# Patient Record
Sex: FEMALE | Race: BLACK OR AFRICAN AMERICAN | NOT HISPANIC OR LATINO | URBAN - METROPOLITAN AREA
[De-identification: names, ages, dates, MRNs, and addresses within clinical notes are randomized per-mention and may not be internally consistent; named-entity substitution may affect disease eponyms.]

---

## 2022-08-16 ENCOUNTER — INPATIENT (INPATIENT)
Facility: HOSPITAL | Age: 77
LOS: 3 days | Discharge: HOME | DRG: 291 | End: 2022-08-20
Attending: STUDENT IN AN ORGANIZED HEALTH CARE EDUCATION/TRAINING PROGRAM | Admitting: INTERNAL MEDICINE
Payer: MEDICARE

## 2022-08-16 VITALS
TEMPERATURE: 98 F | HEART RATE: 82 BPM | WEIGHT: 235.01 LBS | HEIGHT: 63 IN | SYSTOLIC BLOOD PRESSURE: 122 MMHG | OXYGEN SATURATION: 96 % | RESPIRATION RATE: 16 BRPM | DIASTOLIC BLOOD PRESSURE: 71 MMHG

## 2022-08-16 DIAGNOSIS — Z29.9 ENCOUNTER FOR PROPHYLACTIC MEASURES, UNSPECIFIED: ICD-10-CM

## 2022-08-16 DIAGNOSIS — Z96.659 PRESENCE OF UNSPECIFIED ARTIFICIAL KNEE JOINT: Chronic | ICD-10-CM

## 2022-08-16 DIAGNOSIS — I48.91 UNSPECIFIED ATRIAL FIBRILLATION: ICD-10-CM

## 2022-08-16 DIAGNOSIS — E87.6 HYPOKALEMIA: ICD-10-CM

## 2022-08-16 DIAGNOSIS — E11.9 TYPE 2 DIABETES MELLITUS WITHOUT COMPLICATIONS: ICD-10-CM

## 2022-08-16 DIAGNOSIS — I50.9 HEART FAILURE, UNSPECIFIED: ICD-10-CM

## 2022-08-16 LAB
ALBUMIN SERPL ELPH-MCNC: 3 G/DL — LOW (ref 3.3–5)
ALP SERPL-CCNC: 90 U/L — SIGNIFICANT CHANGE UP (ref 40–120)
ALT FLD-CCNC: 26 U/L — SIGNIFICANT CHANGE UP (ref 12–78)
ANION GAP SERPL CALC-SCNC: 4 MMOL/L — LOW (ref 5–17)
AST SERPL-CCNC: 17 U/L — SIGNIFICANT CHANGE UP (ref 15–37)
BASOPHILS # BLD AUTO: 0.04 K/UL — SIGNIFICANT CHANGE UP (ref 0–0.2)
BASOPHILS NFR BLD AUTO: 0.6 % — SIGNIFICANT CHANGE UP (ref 0–2)
BILIRUB SERPL-MCNC: 0.4 MG/DL — SIGNIFICANT CHANGE UP (ref 0.2–1.2)
BUN SERPL-MCNC: 8 MG/DL — SIGNIFICANT CHANGE UP (ref 7–23)
CALCIUM SERPL-MCNC: 10 MG/DL — SIGNIFICANT CHANGE UP (ref 8.5–10.1)
CHLORIDE SERPL-SCNC: 96 MMOL/L — SIGNIFICANT CHANGE UP (ref 96–108)
CK SERPL-CCNC: 179 U/L — SIGNIFICANT CHANGE UP (ref 26–192)
CO2 SERPL-SCNC: 36 MMOL/L — HIGH (ref 22–31)
CREAT SERPL-MCNC: 0.6 MG/DL — SIGNIFICANT CHANGE UP (ref 0.5–1.3)
EGFR: 93 ML/MIN/1.73M2 — SIGNIFICANT CHANGE UP
EOSINOPHIL # BLD AUTO: 0.07 K/UL — SIGNIFICANT CHANGE UP (ref 0–0.5)
EOSINOPHIL NFR BLD AUTO: 1 % — SIGNIFICANT CHANGE UP (ref 0–6)
GLUCOSE SERPL-MCNC: 110 MG/DL — HIGH (ref 70–99)
HCT VFR BLD CALC: 37.3 % — SIGNIFICANT CHANGE UP (ref 34.5–45)
HGB BLD-MCNC: 11.7 G/DL — SIGNIFICANT CHANGE UP (ref 11.5–15.5)
IMM GRANULOCYTES NFR BLD AUTO: 0.3 % — SIGNIFICANT CHANGE UP (ref 0–1.5)
LYMPHOCYTES # BLD AUTO: 1.55 K/UL — SIGNIFICANT CHANGE UP (ref 1–3.3)
LYMPHOCYTES # BLD AUTO: 23.1 % — SIGNIFICANT CHANGE UP (ref 13–44)
MCHC RBC-ENTMCNC: 24.7 PG — LOW (ref 27–34)
MCHC RBC-ENTMCNC: 31.4 GM/DL — LOW (ref 32–36)
MCV RBC AUTO: 78.7 FL — LOW (ref 80–100)
MONOCYTES # BLD AUTO: 0.96 K/UL — HIGH (ref 0–0.9)
MONOCYTES NFR BLD AUTO: 14.3 % — HIGH (ref 2–14)
NEUTROPHILS # BLD AUTO: 4.07 K/UL — SIGNIFICANT CHANGE UP (ref 1.8–7.4)
NEUTROPHILS NFR BLD AUTO: 60.7 % — SIGNIFICANT CHANGE UP (ref 43–77)
NRBC # BLD: 0 /100 WBCS — SIGNIFICANT CHANGE UP (ref 0–0)
NT-PROBNP SERPL-SCNC: 660 PG/ML — HIGH (ref 0–450)
PLATELET # BLD AUTO: 235 K/UL — SIGNIFICANT CHANGE UP (ref 150–400)
POTASSIUM SERPL-MCNC: 3.2 MMOL/L — LOW (ref 3.5–5.3)
POTASSIUM SERPL-SCNC: 3.2 MMOL/L — LOW (ref 3.5–5.3)
PROT SERPL-MCNC: 7.2 G/DL — SIGNIFICANT CHANGE UP (ref 6–8.3)
RAPID RVP RESULT: SIGNIFICANT CHANGE UP
RBC # BLD: 4.74 M/UL — SIGNIFICANT CHANGE UP (ref 3.8–5.2)
RBC # FLD: 15.5 % — HIGH (ref 10.3–14.5)
SARS-COV-2 RNA SPEC QL NAA+PROBE: SIGNIFICANT CHANGE UP
SODIUM SERPL-SCNC: 136 MMOL/L — SIGNIFICANT CHANGE UP (ref 135–145)
TROPONIN I, HIGH SENSITIVITY RESULT: 13.7 NG/L — SIGNIFICANT CHANGE UP
WBC # BLD: 6.71 K/UL — SIGNIFICANT CHANGE UP (ref 3.8–10.5)
WBC # FLD AUTO: 6.71 K/UL — SIGNIFICANT CHANGE UP (ref 3.8–10.5)

## 2022-08-16 PROCEDURE — 99285 EMERGENCY DEPT VISIT HI MDM: CPT | Mod: FS

## 2022-08-16 PROCEDURE — 99223 1ST HOSP IP/OBS HIGH 75: CPT

## 2022-08-16 PROCEDURE — 93010 ELECTROCARDIOGRAM REPORT: CPT | Mod: 76

## 2022-08-16 PROCEDURE — 71045 X-RAY EXAM CHEST 1 VIEW: CPT | Mod: 26

## 2022-08-16 RX ORDER — DEXTROSE 50 % IN WATER 50 %
25 SYRINGE (ML) INTRAVENOUS ONCE
Refills: 0 | Status: DISCONTINUED | OUTPATIENT
Start: 2022-08-16 | End: 2022-08-20

## 2022-08-16 RX ORDER — ENOXAPARIN SODIUM 100 MG/ML
100 INJECTION SUBCUTANEOUS EVERY 12 HOURS
Refills: 0 | Status: DISCONTINUED | OUTPATIENT
Start: 2022-08-17 | End: 2022-08-20

## 2022-08-16 RX ORDER — ENOXAPARIN SODIUM 100 MG/ML
100 INJECTION SUBCUTANEOUS ONCE
Refills: 0 | Status: COMPLETED | OUTPATIENT
Start: 2022-08-16 | End: 2022-08-16

## 2022-08-16 RX ORDER — SODIUM CHLORIDE 9 MG/ML
1000 INJECTION, SOLUTION INTRAVENOUS
Refills: 0 | Status: DISCONTINUED | OUTPATIENT
Start: 2022-08-16 | End: 2022-08-20

## 2022-08-16 RX ORDER — GLUCAGON INJECTION, SOLUTION 0.5 MG/.1ML
1 INJECTION, SOLUTION SUBCUTANEOUS ONCE
Refills: 0 | Status: DISCONTINUED | OUTPATIENT
Start: 2022-08-16 | End: 2022-08-20

## 2022-08-16 RX ORDER — LANOLIN ALCOHOL/MO/W.PET/CERES
3 CREAM (GRAM) TOPICAL AT BEDTIME
Refills: 0 | Status: DISCONTINUED | OUTPATIENT
Start: 2022-08-16 | End: 2022-08-20

## 2022-08-16 RX ORDER — FUROSEMIDE 40 MG
20 TABLET ORAL DAILY
Refills: 0 | Status: DISCONTINUED | OUTPATIENT
Start: 2022-08-17 | End: 2022-08-17

## 2022-08-16 RX ORDER — INSULIN LISPRO 100/ML
VIAL (ML) SUBCUTANEOUS AT BEDTIME
Refills: 0 | Status: DISCONTINUED | OUTPATIENT
Start: 2022-08-16 | End: 2022-08-20

## 2022-08-16 RX ORDER — POTASSIUM CHLORIDE 20 MEQ
40 PACKET (EA) ORAL ONCE
Refills: 0 | Status: COMPLETED | OUTPATIENT
Start: 2022-08-16 | End: 2022-08-16

## 2022-08-16 RX ORDER — DEXTROSE 50 % IN WATER 50 %
12.5 SYRINGE (ML) INTRAVENOUS ONCE
Refills: 0 | Status: DISCONTINUED | OUTPATIENT
Start: 2022-08-16 | End: 2022-08-20

## 2022-08-16 RX ORDER — METFORMIN HYDROCHLORIDE 850 MG/1
1 TABLET ORAL
Qty: 0 | Refills: 0 | DISCHARGE

## 2022-08-16 RX ORDER — ACETAMINOPHEN 500 MG
650 TABLET ORAL EVERY 6 HOURS
Refills: 0 | Status: DISCONTINUED | OUTPATIENT
Start: 2022-08-16 | End: 2022-08-20

## 2022-08-16 RX ORDER — DEXTROSE 50 % IN WATER 50 %
15 SYRINGE (ML) INTRAVENOUS ONCE
Refills: 0 | Status: DISCONTINUED | OUTPATIENT
Start: 2022-08-16 | End: 2022-08-20

## 2022-08-16 RX ORDER — INSULIN LISPRO 100/ML
VIAL (ML) SUBCUTANEOUS
Refills: 0 | Status: DISCONTINUED | OUTPATIENT
Start: 2022-08-16 | End: 2022-08-20

## 2022-08-16 RX ORDER — FUROSEMIDE 40 MG
20 TABLET ORAL ONCE
Refills: 0 | Status: COMPLETED | OUTPATIENT
Start: 2022-08-16 | End: 2022-08-16

## 2022-08-16 RX ADMIN — ENOXAPARIN SODIUM 100 MILLIGRAM(S): 100 INJECTION SUBCUTANEOUS at 20:12

## 2022-08-16 RX ADMIN — Medication 650 MILLIGRAM(S): at 23:38

## 2022-08-16 RX ADMIN — Medication 20 MILLIGRAM(S): at 20:11

## 2022-08-16 RX ADMIN — Medication 40 MILLIEQUIVALENT(S): at 20:11

## 2022-08-16 NOTE — H&P ADULT - PROBLEM SELECTOR PLAN 4
- Unknown A1c, glucose 110 on admission.   - Pt reports finger stick at home 100-130's in fasting   - Hold home metformin  - Start lispro low dose slid correct. scale, hypoglycemia protocol  - Accuckecks and adjust insulin as needed  - Diabetic diet.

## 2022-08-16 NOTE — ED ADULT NURSE NOTE - OBJECTIVE STATEMENT
Patient is a 75yo female complaining of worsening cough shortness of breath and bilateral leg swelling, Patient denies chest pain nausea vomiting diarrhea fever. patient states that she has a cough and adenovirus. Patient states that she can not lay flat. Patient has a diagnosis of  CHF Family report increase b/l LE swelling and cough

## 2022-08-16 NOTE — H&P ADULT - NSHPSOCIALHISTORY_GEN_ALL_CORE
Pt lives with daughter.  Independent for her activities, using cane for chronic knee pain.   Former smoker 2 cig/day and quit 18 years ago.  Occasional alcohol use.   Covid vaccine x2 and 1 booster 12/21

## 2022-08-16 NOTE — CHART NOTE - NSCHARTNOTEFT_GEN_A_CORE
RN called for right upper leg cramping.   Pt given tylenol and heating pad.   Pt has new onset afib. Currently on full dose AC. Per H&P, pt with new lower leg edema attributed to possible CHF.  BL LE dopplers ordered to r/o DVT.

## 2022-08-16 NOTE — ED PROVIDER NOTE - OBJECTIVE STATEMENT
pmd: Dr. Kaushik Brush 75 yo female with h/o htn and chf presents to the ED c/o cough, sob and worsening peripheral edema x several days. Patient recently around her grandson who tested positive for adenovirus. Patient with worsening SOB with laying flat and exertion. Denies fever, chills, chest pain, abd pain, N/V, LE weakness or paresthesias, syncope. Patient takes htcz, not on lasix.   pmd: Dr. Kaushik Brush

## 2022-08-16 NOTE — H&P ADULT - PROBLEM SELECTOR PLAN 1
- Pt with 1-2 weeks of orthopnea, and the last 3 days with DUNCAN, LE edema and cough 2/2 acute HF. Less likely respiratory source, denies upper respiratory symptoms, no fever.   - No h/o CHF, not on Lasix at home   - Mild volume overload on exam, s/p Lasix 20mg IVP in the ED   - pBNP 660.   - Negative RVP, normal WBC.   - Continue Lasix 20 mg IVP qd   - TTE ordered.  - Monitor I&Os, renal function.   - Cardiology consult.

## 2022-08-16 NOTE — H&P ADULT - ATTENDING COMMENTS
76 yr old obese F with known DM,HTN a/w CHF decompensation, EKG with new onset A.Fib -hemodynamically stable  Full dose AC , no obvious CI  diurese  tele monitoring  I/O  monitor clinically  DASH diet    Cardiology evaluation

## 2022-08-16 NOTE — ED PROVIDER NOTE - CARE PLAN
Principal Discharge DX:	New onset atrial fibrillation  Secondary Diagnosis:	Acute on chronic systolic congestive heart failure   1

## 2022-08-16 NOTE — H&P ADULT - NSHPREVIEWOFSYSTEMS_GEN_ALL_CORE
CONSTITUTIONAL: denies fever, chills, fatigue, weakness  HEENT: denies sore throat, nasal congestion.   SKIN: denies new lesions, rash  CARDIOVASCULAR: denies chest pain, chest pressure  + palpitations 2 nights ago.   RESPIRATORY: + shortness of breath/DUNCAN, wet cough with clear secretions.   GASTROINTESTINAL: denies nausea, vomiting, diarrhea, abdominal pain  GENITOURINARY: denies dysuria  NEUROLOGICAL: denies focal numbness, headache, focal weakness  MUSCULOSKELETAL: denies new joint pain. + chronic knee pain   HEMATOLOGIC: denies gross bleeding, bruising, melena, hematochezia.   LYMPHATICS: + LE extremity swelling  PSYCHIATRIC: denies anxiety, depression

## 2022-08-16 NOTE — ED ADULT NURSE REASSESSMENT NOTE - NS ED NURSE REASSESS COMMENT FT1
Pt received from PRISCILLA Wong. Pt is AOx4 son at bedside. Pt repositioned up in bed. VSS. Pt has c/o cough denies chest pain or SOB. plan of care updated. will reassess.

## 2022-08-16 NOTE — H&P ADULT - ASSESSMENT
Pt is 75 yo female with PMH of HTN, T2DM, obesity, presents to the ED for DUNCAN. Pt reports 1 to 2 weeks of orthopnea, associated over the last 2 days to DUNCAN, wet cough and LE edema.  Admitted for acute heart failure and found to have Afib with rate controlled.

## 2022-08-16 NOTE — ED ADULT TRIAGE NOTE - CHIEF COMPLAINT QUOTE
Patient arrives in w/c c/o cough x 4 days. Denies nausea vomiting diarrhea. diagnosed with mild CHF and had Adenovirus. Family report increase b/l LE swelling.

## 2022-08-16 NOTE — ED PROVIDER NOTE - NS ED ATTENDING STATEMENT MOD
This was a shared visit with the ZAIN[facility]. I reviewed and verified the documentation and independently performed the documented:

## 2022-08-16 NOTE — ED PROVIDER NOTE - CLINICAL SUMMARY MEDICAL DECISION MAKING FREE TEXT BOX
76-year-old female with history of hypertension, CHF presents with shortness of breath over past 3 days.  Positive cough, slight productive.  No fevers or chills.  Patient with lower extremity edema worse than usual as well.  No neck or back pain.  No acute chest pain.  No numbness/tingling/focal weakness.  No known COVID exposures.  Patient is fully vaccinated for COVID.  No weakness or dizziness.  No aggravating/alleviating factors.  No other acute complaints at this time.  Exam: MMM, nontoxic, well-appearing.  Normal respiratory effort.  Decreased breath sounds bilateral lungs, no acute wheezing/rales.  2+ bilateral lower extremity edema.  Normal distal strength/sensation equal bilaterally.  Abdomen soft, nontender nondistended.  No other acute findings on exam.  Acute shortness of breath, with some URI symptoms, associate with lower extremity edema.  Patient with history of CHF.  Check labs, x-ray, RVP/COVID, likely admission.

## 2022-08-16 NOTE — H&P ADULT - NSHPPHYSICALEXAM_GEN_ALL_CORE
T(C): 36.6 (08-16-22 @ 19:30), Max: 36.6 (08-16-22 @ 19:30)  HR: 91 (08-16-22 @ 19:30) (82 - 91)  BP: 121/77 (08-16-22 @ 19:30) (121/77 - 122/71)  RR: 16 (08-16-22 @ 19:30) (16 - 16)  SpO2: 93% (08-16-22 @ 19:30) (93% - 96%)    GENERAL: patient appears well, no acute distress, breathing well at RA.   EYES: sclera clear, no exudates  ENMT: oropharynx clear, moist oral mucous.  NECK: supple, soft, no JVD  LUNGS: mild diffuse rhonchi improving after coughing.   HEART: soft, S1,S2. Irregular rate and rhythm, no murmurs noted.   ABDOMEN: bowel sounds present. Soft, nontender, nondistended.   NEUROLOGIC: awake, alert, oriented x3, non focal.   EXTREMITIES: no clubbing or cyanosis, distal and mild pitting LE edema.   SKIN: warm, appears well perfused.  HEME/LYMPH: no ecchymosis or signs of bleeding.   PSYCH: appropriately interactive, normal mood and affect. T(C): 36.6 (08-16-22 @ 19:30), Max: 36.6 (08-16-22 @ 19:30)  HR: 91 (08-16-22 @ 19:30) (82 - 91)  BP: 121/77 (08-16-22 @ 19:30) (121/77 - 122/71)  RR: 16 (08-16-22 @ 19:30) (16 - 16)  SpO2: 93% (08-16-22 @ 19:30) (93% - 96%)    GENERAL: patient appears well, no acute distress, breathing well at RA.   EYES: sclera clear, no exudates  ENMT: oropharynx clear, moist oral mucous.  NECK: supple, soft, no JVD  LUNGS: mild diffuse rhonchi improving after coughing.   HEART: soft, S1,S2. Irregular rate and rhythm, no murmurs noted.   ABDOMEN: bowel sounds present. Soft, nontender, nondistended.   NEUROLOGIC: awake, alert, oriented x3, non focal.   EXTREMITIES: no clubbing or cyanosis, distal and mild symmetric pitting LE edema.   SKIN: warm, appears well perfused.  HEME/LYMPH: no ecchymosis or signs of bleeding.   PSYCH: appropriately interactive, normal mood and affect.

## 2022-08-16 NOTE — H&P ADULT - PROBLEM SELECTOR PLAN 2
- No known h/o AFib.  Admits palpitations 2 nights ago that self resolved   - EKG with Afib rate controlled 82 bpm.   - No contraindications were found for anticoagulation.   - s/p Lovenox 100mg SC x1  - Continue full dose AC Lovenox   - Not BB for now in the setting of rate controlled and new acute CHF.  - Telemetry monitor. - No known h/o AFib.  Admits palpitations 2 nights ago that self resolved   - EKG with Afib rate controlled 82 bpm.   - EMMANUEL-DS-VAs score 5  - No contraindications were found for anticoagulation.   - s/p Lovenox 100mg SC x1  - Continue full dose AC Lovenox   - Not BB for now in the setting of rate controlled and new acute CHF.  - Telemetry monitor.

## 2022-08-17 ENCOUNTER — TRANSCRIPTION ENCOUNTER (OUTPATIENT)
Age: 77
End: 2022-08-17

## 2022-08-17 LAB
A1C WITH ESTIMATED AVERAGE GLUCOSE RESULT: 6.6 % — HIGH (ref 4–5.6)
ANION GAP SERPL CALC-SCNC: 6 MMOL/L — SIGNIFICANT CHANGE UP (ref 5–17)
BUN SERPL-MCNC: 8 MG/DL — SIGNIFICANT CHANGE UP (ref 7–23)
CALCIUM SERPL-MCNC: 10 MG/DL — SIGNIFICANT CHANGE UP (ref 8.5–10.1)
CHLORIDE SERPL-SCNC: 96 MMOL/L — SIGNIFICANT CHANGE UP (ref 96–108)
CO2 SERPL-SCNC: 37 MMOL/L — HIGH (ref 22–31)
CREAT SERPL-MCNC: 0.74 MG/DL — SIGNIFICANT CHANGE UP (ref 0.5–1.3)
EGFR: 84 ML/MIN/1.73M2 — SIGNIFICANT CHANGE UP
ESTIMATED AVERAGE GLUCOSE: 143 MG/DL — HIGH (ref 68–114)
GLUCOSE SERPL-MCNC: 117 MG/DL — HIGH (ref 70–99)
HCT VFR BLD CALC: 36.9 % — SIGNIFICANT CHANGE UP (ref 34.5–45)
HCV AB S/CO SERPL IA: 0.12 S/CO — SIGNIFICANT CHANGE UP (ref 0–0.99)
HCV AB SERPL-IMP: SIGNIFICANT CHANGE UP
HGB BLD-MCNC: 11.7 G/DL — SIGNIFICANT CHANGE UP (ref 11.5–15.5)
MAGNESIUM SERPL-MCNC: 1.9 MG/DL — SIGNIFICANT CHANGE UP (ref 1.6–2.6)
MCHC RBC-ENTMCNC: 24.7 PG — LOW (ref 27–34)
MCHC RBC-ENTMCNC: 31.7 GM/DL — LOW (ref 32–36)
MCV RBC AUTO: 77.8 FL — LOW (ref 80–100)
NRBC # BLD: 0 /100 WBCS — SIGNIFICANT CHANGE UP (ref 0–0)
PHOSPHATE SERPL-MCNC: 3.7 MG/DL — SIGNIFICANT CHANGE UP (ref 2.5–4.5)
PLATELET # BLD AUTO: 258 K/UL — SIGNIFICANT CHANGE UP (ref 150–400)
POTASSIUM SERPL-MCNC: 3.5 MMOL/L — SIGNIFICANT CHANGE UP (ref 3.5–5.3)
POTASSIUM SERPL-SCNC: 3.5 MMOL/L — SIGNIFICANT CHANGE UP (ref 3.5–5.3)
RBC # BLD: 4.74 M/UL — SIGNIFICANT CHANGE UP (ref 3.8–5.2)
RBC # FLD: 15.3 % — HIGH (ref 10.3–14.5)
SODIUM SERPL-SCNC: 139 MMOL/L — SIGNIFICANT CHANGE UP (ref 135–145)
WBC # BLD: 5.72 K/UL — SIGNIFICANT CHANGE UP (ref 3.8–10.5)
WBC # FLD AUTO: 5.72 K/UL — SIGNIFICANT CHANGE UP (ref 3.8–10.5)

## 2022-08-17 PROCEDURE — 99233 SBSQ HOSP IP/OBS HIGH 50: CPT

## 2022-08-17 PROCEDURE — 93970 EXTREMITY STUDY: CPT | Mod: 26

## 2022-08-17 PROCEDURE — 99223 1ST HOSP IP/OBS HIGH 75: CPT

## 2022-08-17 RX ORDER — METOPROLOL TARTRATE 50 MG
25 TABLET ORAL EVERY 12 HOURS
Refills: 0 | Status: DISCONTINUED | OUTPATIENT
Start: 2022-08-17 | End: 2022-08-20

## 2022-08-17 RX ORDER — METOPROLOL TARTRATE 50 MG
25 TABLET ORAL DAILY
Refills: 0 | Status: DISCONTINUED | OUTPATIENT
Start: 2022-08-17 | End: 2022-08-17

## 2022-08-17 RX ORDER — FUROSEMIDE 40 MG
20 TABLET ORAL
Refills: 0 | Status: DISCONTINUED | OUTPATIENT
Start: 2022-08-17 | End: 2022-08-18

## 2022-08-17 RX ADMIN — Medication 20 MILLIGRAM(S): at 06:31

## 2022-08-17 RX ADMIN — Medication 25 MILLIGRAM(S): at 19:00

## 2022-08-17 RX ADMIN — Medication 20 MILLIGRAM(S): at 19:00

## 2022-08-17 RX ADMIN — Medication 200 MILLIGRAM(S): at 12:24

## 2022-08-17 RX ADMIN — Medication 200 MILLIGRAM(S): at 23:33

## 2022-08-17 RX ADMIN — ENOXAPARIN SODIUM 100 MILLIGRAM(S): 100 INJECTION SUBCUTANEOUS at 06:30

## 2022-08-17 RX ADMIN — Medication 25 MILLIGRAM(S): at 12:24

## 2022-08-17 RX ADMIN — Medication 200 MILLIGRAM(S): at 18:59

## 2022-08-17 RX ADMIN — ENOXAPARIN SODIUM 100 MILLIGRAM(S): 100 INJECTION SUBCUTANEOUS at 19:00

## 2022-08-17 RX ADMIN — Medication 650 MILLIGRAM(S): at 00:38

## 2022-08-17 NOTE — DISCHARGE NOTE NURSING/CASE MANAGEMENT/SOCIAL WORK - NSSCTYPOFSERV_GEN_ALL_CORE
Nursing and Physical Therapy with evaluation for home health aide and social work Nursing and Physical Therapy with evaluation for home health aide and social work.  Agency will call within 24-48 hours after discharge

## 2022-08-17 NOTE — CONSULT NOTE ADULT - SUBJECTIVE AND OBJECTIVE BOX
[facility]         Shawna Bell, Shola, Isaura, Kevin, Sg, Jackie        [phone] (office)    CHIEF COMPLAINT: Patient is a 76y old  Female who presents with a chief complaint of CHF exacerbation (17 Aug 2022 08:29)      HPI:  Pt is 77 yo female with PMH of HTN, T2DM, obesity, presents to the ED for DUNCAN. Pt reports 1 to 2 weeks of orthopnea, associated over the last 2 days to DUNCAN, wet cough and LE edema. Pt states normal breathing at rest/sitting position. Admits palpitations 2 nights ago that resolved, and non associated to other symptoms. At the moment of the evaluation pt reports improvement of LE edema after IV med.  Denies dizziness, lightheadedness, fever, chest pain, focal weakness or numbness, abdominal pain.   Denies previous h/o any arrhythmia, Afib, CHF.   As per pt, last PCP visit months ago, EKG and labs done "normal". She is not seeing cardiologist.    ED course:  - Vitals:  /71, HR 82, RR 16, temp 97.6, O2 Sat 96 % at RA  - Labs: WBC 6.71, Hb 11.7 (MCV 78), .  K 3.2, HCO3 36. pBNP 660.  Cr 0.6.  Troponin normal.  Negative RVP and Covid.    - CXR:  pending official reading.   - EKG: afib, normal rate 82 bpm    - s/p Lasix 20mg IVP x1, Lovenox 100mg SC qd, K 40 meq PO.  (16 Aug 2022 22:07)    Interval HPI:   Patient seen and examined at beside. Patient laying comfortably and in NAD. Son at bedside who is Respiratory Therapist states that the LE edema has improved tremendously Patient denies any heart palpitions or chest pain. Though dose endorses dyspnea on exertion. Of note, overnight was complaining of b/l leg cramps that have now resolved.     PAST MEDICAL & SURGICAL HISTORY:  Hypertension      Type 2 diabetes mellitus      History of knee replacement  bilateral          SOCIAL HISTORY: No active tobacco, alcohol or illicit drug use    FAMILY HISTORY:  No pertinent family history in first degree relatives     No pertinent family history of CAD    Outpatient medications:    MEDICATIONS  (STANDING):  dextrose 5%. 1000 milliLiter(s) (50 mL/Hr) IV Continuous <Continuous>  dextrose 5%. 1000 milliLiter(s) (100 mL/Hr) IV Continuous <Continuous>  dextrose 50% Injectable 25 Gram(s) IV Push once  dextrose 50% Injectable 12.5 Gram(s) IV Push once  dextrose 50% Injectable 25 Gram(s) IV Push once  enoxaparin Injectable 100 milliGRAM(s) SubCutaneous every 12 hours  furosemide   Injectable 20 milliGRAM(s) IV Push daily  glucagon  Injectable 1 milliGRAM(s) IntraMuscular once  insulin lispro (ADMELOG) corrective regimen sliding scale   SubCutaneous three times a day before meals  insulin lispro (ADMELOG) corrective regimen sliding scale   SubCutaneous at bedtime    MEDICATIONS  (PRN):  acetaminophen     Tablet .. 650 milliGRAM(s) Oral every 6 hours PRN Temp greater or equal to 38C (100.4F), Mild Pain (1 - 3)  dextrose Oral Gel 15 Gram(s) Oral once PRN Blood Glucose LESS THAN 70 milliGRAM(s)/deciliter  melatonin 3 milliGRAM(s) Oral at bedtime PRN Insomnia      Allergies    No Known Allergies    Intolerances        REVIEW OF SYSTEMS: Is negative for eye, ENT, GI, , allergic, dermatologic, musculoskeletal and neurologic, except as described above.    VITAL SIGNS:   Vital Signs Last 24 Hrs  T(C): 36.7 (17 Aug 2022 07:58), Max: 36.7 (17 Aug 2022 07:58)  T(F): 98 (17 Aug 2022 07:58), Max: 98 (17 Aug 2022 07:58)  HR: 88 (17 Aug 2022 07:58) (82 - 91)  BP: 144/70 (17 Aug 2022 07:58) (121/77 - 167/77)  BP(mean): --  RR: 21 (17 Aug 2022 07:58) (16 - 22)  SpO2: 98% (17 Aug 2022 07:58) (93% - 98%)    Parameters below as of 17 Aug 2022 06:37  Patient On (Oxygen Delivery Method): room air        I&O's Summary    16 Aug 2022 07:01  -  17 Aug 2022 07:00  --------------------------------------------------------  IN: 0 mL / OUT: 1500 mL / NET: -1500 mL        PHYSICAL EXAM:    Constitutional: NAD, awake and alert, well-developed  Pulmonary: Non-labored, breath sounds are clear bilaterally though decreased in lower lobes. No wheezing, rales or rhonchi  Cardiovascular: Irregular heart rhythm.  No rubs, gallops or clicks  Gastrointestinalsoft, nontender.   Lymph: trace peripheral edema.   Neurological: Alert, strength and sensitivity are grossly intact  Skin: No obvious lesions/rashes.   Psych:  Mood & affect appropriate .    LABS: All Labs Reviewed:                        11.7   6.71  )-----------( 235      ( 16 Aug 2022 16:08 )             37.3     17 Aug 2022 06:50    139    |  96     |  8      ----------------------------<  117    3.5     |  37     |  0.74   16 Aug 2022 16:08    136    |  96     |  8      ----------------------------<  110    3.2     |  36     |  0.60     Ca    10.0       17 Aug 2022 06:50  Ca    10.0       16 Aug 2022 16:08  Phos  3.7       17 Aug 2022 06:50  Mg     1.9       17 Aug 2022 06:50    TPro  7.2    /  Alb  3.0    /  TBili  0.4    /  DBili  x      /  AST  17     /  ALT  26     /  AlkPhos  90     16 Aug 2022 16:08      CARDIAC MARKERS ( 16 Aug 2022 16:08 )  x     / x     / 179 U/L / x     / x          Blood Culture:   08-16 @ 16:08  Pro Bnp 660        RADIOLOGY: Chest X-ray done pending official read     EKG: AFib HR 88 BPM     Impression/Plan:

## 2022-08-17 NOTE — PROGRESS NOTE ADULT - PROBLEM SELECTOR PLAN 2
- No known h/o AFib.  Admits palpitations 2 nights ago that self resolved   - EKG with Afib rate controlled 82 bpm.   - EMMANUEL-DS-VAs score 5  - No contraindications were found for anticoagulation.   - s/p Lovenox 100mg SC x1  - Continue full dose AC Lovenox   - Not BB for now in the setting of rate controlled and new acute CHF.  - Telemetry monitor. - No known h/o AFib.  Admits palpitations 2 nights ago that self resolved   - EKG with Afib rate controlled 82 bpm.   - EMMANUEL-DS-VAs score 5  - No contraindications were found for anticoagulation.   - s/p Lovenox 100mg SC x1  - Continue full dose AC Lovenox   - Not BB for now in the setting of rate controlled and new acute CHF.  - Telemetry monitor  -  Cardiology recs

## 2022-08-17 NOTE — PROGRESS NOTE ADULT - SUBJECTIVE AND OBJECTIVE BOX
SUBJECTIVE:    No acute events overnight, afebrile, hds.    VITAL SIGNS:    Vital Signs Last 24 Hrs  T(C): 36.7 (17 Aug 2022 07:58), Max: 36.7 (17 Aug 2022 07:58)  T(F): 98 (17 Aug 2022 07:58), Max: 98 (17 Aug 2022 07:58)  HR: 88 (17 Aug 2022 07:58) (82 - 91)  BP: 144/70 (17 Aug 2022 07:58) (121/77 - 167/77)  BP(mean): --  RR: 21 (17 Aug 2022 07:58) (16 - 22)  SpO2: 98% (17 Aug 2022 07:58) (93% - 98%)    Parameters below as of 17 Aug 2022 06:37  Patient On (Oxygen Delivery Method): room air        PHYSICAL EXAM:     GENERAL: no acute distress  HEENT: NC/AT, EOMI, neck supple, MMM  RESPIRATORY: LCTAB/L, no rhonchi, rales, or wheezing  CARDIOVASCULAR: RRR, no murmurs, gallops, rubs  ABDOMINAL: soft, non-tender, non-distended, positive bowel sounds   EXTREMITIES: no clubbing, cyanosis, or edema  NEUROLOGICAL: alert and oriented x 3, non-focal  SKIN: no rashes or lesions   MUSCULOSKELETAL: no gross joint deformity                          11.7   6.71  )-----------( 235      ( 16 Aug 2022 16:08 )             37.3     08-17    139  |  96  |  8   ----------------------------<  117<H>  3.5   |  37<H>  |  0.74    Ca    10.0      17 Aug 2022 06:50  Phos  3.7     08-17  Mg     1.9     08-17    TPro  7.2  /  Alb  3.0<L>  /  TBili  0.4  /  DBili  x   /  AST  17  /  ALT  26  /  AlkPhos  90  08-16      CAPILLARY BLOOD GLUCOSE      POCT Blood Glucose.: 138 mg/dL (16 Aug 2022 23:36)      MEDICATIONS  (STANDING):  dextrose 5%. 1000 milliLiter(s) (50 mL/Hr) IV Continuous <Continuous>  dextrose 5%. 1000 milliLiter(s) (100 mL/Hr) IV Continuous <Continuous>  dextrose 50% Injectable 25 Gram(s) IV Push once  dextrose 50% Injectable 12.5 Gram(s) IV Push once  dextrose 50% Injectable 25 Gram(s) IV Push once  enoxaparin Injectable 100 milliGRAM(s) SubCutaneous every 12 hours  furosemide   Injectable 20 milliGRAM(s) IV Push daily  glucagon  Injectable 1 milliGRAM(s) IntraMuscular once  insulin lispro (ADMELOG) corrective regimen sliding scale   SubCutaneous three times a day before meals  insulin lispro (ADMELOG) corrective regimen sliding scale   SubCutaneous at bedtime       SUBJECTIVE:    Afebrile, hds.  Pt had a sense of overnight leg cramping.    VITAL SIGNS:    Vital Signs Last 24 Hrs  T(C): 36.7 (17 Aug 2022 07:58), Max: 36.7 (17 Aug 2022 07:58)  T(F): 98 (17 Aug 2022 07:58), Max: 98 (17 Aug 2022 07:58)  HR: 88 (17 Aug 2022 07:58) (82 - 91)  BP: 144/70 (17 Aug 2022 07:58) (121/77 - 167/77)  BP(mean): --  RR: 21 (17 Aug 2022 07:58) (16 - 22)  SpO2: 98% (17 Aug 2022 07:58) (93% - 98%)    Parameters below as of 17 Aug 2022 06:37  Patient On (Oxygen Delivery Method): room air        PHYSICAL EXAM:     GENERAL: no acute distress  HEENT: NC/AT, EOMI, neck supple, MMM  RESPIRATORY: LCTAB/L, no rhonchi, rales, or wheezing  CARDIOVASCULAR: RRR, no murmurs, gallops, rubs  ABDOMINAL: soft, non-tender, non-distended, positive bowel sounds   EXTREMITIES: no clubbing, cyanosis, b/l le edema  NEUROLOGICAL: alert and oriented x 3, non-focal  SKIN: no rashes or lesions   MUSCULOSKELETAL: no gross joint deformity                          11.7   6.71  )-----------( 235      ( 16 Aug 2022 16:08 )             37.3     08-17    139  |  96  |  8   ----------------------------<  117<H>  3.5   |  37<H>  |  0.74    Ca    10.0      17 Aug 2022 06:50  Phos  3.7     08-17  Mg     1.9     08-17    TPro  7.2  /  Alb  3.0<L>  /  TBili  0.4  /  DBili  x   /  AST  17  /  ALT  26  /  AlkPhos  90  08-16      CAPILLARY BLOOD GLUCOSE      POCT Blood Glucose.: 138 mg/dL (16 Aug 2022 23:36)      MEDICATIONS  (STANDING):  dextrose 5%. 1000 milliLiter(s) (50 mL/Hr) IV Continuous <Continuous>  dextrose 5%. 1000 milliLiter(s) (100 mL/Hr) IV Continuous <Continuous>  dextrose 50% Injectable 25 Gram(s) IV Push once  dextrose 50% Injectable 12.5 Gram(s) IV Push once  dextrose 50% Injectable 25 Gram(s) IV Push once  enoxaparin Injectable 100 milliGRAM(s) SubCutaneous every 12 hours  furosemide   Injectable 20 milliGRAM(s) IV Push daily  glucagon  Injectable 1 milliGRAM(s) IntraMuscular once  insulin lispro (ADMELOG) corrective regimen sliding scale   SubCutaneous three times a day before meals  insulin lispro (ADMELOG) corrective regimen sliding scale   SubCutaneous at bedtime

## 2022-08-17 NOTE — DISCHARGE NOTE NURSING/CASE MANAGEMENT/SOCIAL WORK - PATIENT PORTAL LINK FT
You can access the FollowMyHealth Patient Portal offered by [facility] by registering at the following website: [email or web address]. By joining [employer]’s FollowMyHealth portal, you will also be able to view your health information using other applications (apps) compatible with our system.

## 2022-08-17 NOTE — CONSULT NOTE ADULT - ASSESSMENT
75 yo female with PMH of HTN, T2DM, obesity, presents to the ED for DUNCAN. Pt reports 1 to 2 weeks of orthopnea, associated over the last 2 days to DUNCAN, wet cough and LE edema.  Admitted for acute heart failure and found to have Afib with rate controlled.     CHARTING IN PROGRESS  77 yo female with PMH of HTN, T2DM, obesity, presents to the ED for DUNCAN. Pt reports 1 to 2 weeks of orthopnea, associated over the last 2 days to DUNCAN, wet cough and LE edema.  Admitted for acute heart failure and found to have Afib with rate controlled.     - Patient with B/L leg edema + dyspnea on exertion; TTE ordered F/U official read   - Will increase to Lasix 20mg IV BID   - EKG with new onset Afib HR of 88 BPM and on tele monitor a-fib with rates of 90; Start Metoprolol 25mg OD   - Continue on Lovenox Subq and to be transition to Eliquis upon discharge  - Hx of HTN; Hold home Hydrochlorothiazide    - Monitor and replete lyes Mg>2 and K>4   - Will continue to follow      77 yo female with PMH of HTN, T2DM, obesity, presents to the ED for DUNCAN. Pt reports 1 to 2 weeks of orthopnea, associated over the last 2 days to DUNCAN, wet cough and LE edema.  Admitted for acute heart failure and found to have Afib with rate controlled.     - ADHF  - Patient with B/L leg edema + dyspnea on exertion; TTE ordered F/U official read   - Will increase to Lasix 20mg IV BID   - strict I/o's and daily weights  - EKG with new onset Afib HR of 88 BPM and on tele monitor a-fib with rates of 90; Start Metoprolol tartrate 25 bid  - Continue on Lovenox Subq and to be transition to Eliquis upon discharge  - Hx of HTN; Hold home Hydrochlorothiazide    - Monitor and replete lyes Mg>2 and K>4   - Will continue to follow

## 2022-08-17 NOTE — SBIRT NOTE ADULT - NSSBIRTALCPOSREINDET_GEN_A_CORE
Occasionally drinks wine, can go a month without drinking. No issues. Positive reinforcement provided.

## 2022-08-17 NOTE — DISCHARGE NOTE NURSING/CASE MANAGEMENT/SOCIAL WORK - NSDCPEFALRISK_GEN_ALL_CORE
For information on Fall & Injury Prevention, visit: [email or web address] OR  [email or web address] OR  https://www.cdc.gov/steadi/patient.html

## 2022-08-17 NOTE — CONSULT NOTE ADULT - ATTENDING COMMENTS
mild volume overload and adhf, in the setting of new af  lasix 20 iv bid  echocardiogram to evaluate LV function  af with fair rates, start metoprolol 25 bid  cont ac with lovenox with plan to switch to oral ac

## 2022-08-17 NOTE — PATIENT PROFILE ADULT - FALL HARM RISK - RISK INTERVENTIONS

## 2022-08-17 NOTE — PROGRESS NOTE ADULT - PROBLEM SELECTOR PLAN 1
- Pt with 1-2 weeks of orthopnea, and the last 3 days with DUNCAN, LE edema and cough 2/2 acute HF. Less likely respiratory source, denies upper respiratory symptoms, no fever.   - No h/o CHF, not on Lasix at home   - Mild volume overload on exam, s/p Lasix 20mg IVP in the ED   - pBNP 660.   - Negative RVP, normal WBC.   - Continue Lasix 20 mg IVP qd   - TTE ordered.  - Monitor I&Os, renal function.   - Cardiology consult. - Pt with 1-2 weeks of orthopnea, and the last 3 days with DUNCAN, LE edema and cough 2/2 acute HF. Less likely respiratory source, denies upper respiratory symptoms, no fever.   - No h/o CHF, not on Lasix at home   - Mild volume overload on exam, s/p Lasix 20mg IVP in the ED   - pBNP 660.   - Negative RVP, normal WBC.   - Continue Lasix 20 mg IVP qd   - TTE ordered; LE dopplers ordered fro ovenright leg pain, will f/u results  - Monitor I&Os, renal function.   - Cardiology consult.

## 2022-08-17 NOTE — PHYSICAL THERAPY INITIAL EVALUATION ADULT - ADDITIONAL COMMENTS
Patient reports that she lives with her daughter in a private house, no MARINA, bed/bath on main level. Ambulates with quad cane at baseline, daughter assists as needed with ADLs.

## 2022-08-18 ENCOUNTER — TRANSCRIPTION ENCOUNTER (OUTPATIENT)
Age: 77
End: 2022-08-18

## 2022-08-18 LAB
ANION GAP SERPL CALC-SCNC: 6 MMOL/L — SIGNIFICANT CHANGE UP (ref 5–17)
BUN SERPL-MCNC: 13 MG/DL — SIGNIFICANT CHANGE UP (ref 7–23)
CALCIUM SERPL-MCNC: 9.6 MG/DL — SIGNIFICANT CHANGE UP (ref 8.5–10.1)
CHLORIDE SERPL-SCNC: 96 MMOL/L — SIGNIFICANT CHANGE UP (ref 96–108)
CO2 SERPL-SCNC: 34 MMOL/L — HIGH (ref 22–31)
CREAT SERPL-MCNC: 0.75 MG/DL — SIGNIFICANT CHANGE UP (ref 0.5–1.3)
EGFR: 82 ML/MIN/1.73M2 — SIGNIFICANT CHANGE UP
GLUCOSE SERPL-MCNC: 102 MG/DL — HIGH (ref 70–99)
HCT VFR BLD CALC: 40.6 % — SIGNIFICANT CHANGE UP (ref 34.5–45)
HGB BLD-MCNC: 12.8 G/DL — SIGNIFICANT CHANGE UP (ref 11.5–15.5)
MCHC RBC-ENTMCNC: 24.8 PG — LOW (ref 27–34)
MCHC RBC-ENTMCNC: 31.5 GM/DL — LOW (ref 32–36)
MCV RBC AUTO: 78.7 FL — LOW (ref 80–100)
NRBC # BLD: 0 /100 WBCS — SIGNIFICANT CHANGE UP (ref 0–0)
PLATELET # BLD AUTO: 260 K/UL — SIGNIFICANT CHANGE UP (ref 150–400)
POTASSIUM SERPL-MCNC: 3.8 MMOL/L — SIGNIFICANT CHANGE UP (ref 3.5–5.3)
POTASSIUM SERPL-SCNC: 3.8 MMOL/L — SIGNIFICANT CHANGE UP (ref 3.5–5.3)
RBC # BLD: 5.16 M/UL — SIGNIFICANT CHANGE UP (ref 3.8–5.2)
RBC # FLD: 15.3 % — HIGH (ref 10.3–14.5)
SODIUM SERPL-SCNC: 136 MMOL/L — SIGNIFICANT CHANGE UP (ref 135–145)
WBC # BLD: 4.79 K/UL — SIGNIFICANT CHANGE UP (ref 3.8–10.5)
WBC # FLD AUTO: 4.79 K/UL — SIGNIFICANT CHANGE UP (ref 3.8–10.5)

## 2022-08-18 PROCEDURE — 93306 TTE W/DOPPLER COMPLETE: CPT | Mod: 26

## 2022-08-18 PROCEDURE — 99232 SBSQ HOSP IP/OBS MODERATE 35: CPT

## 2022-08-18 PROCEDURE — 99233 SBSQ HOSP IP/OBS HIGH 50: CPT

## 2022-08-18 RX ORDER — METOPROLOL TARTRATE 50 MG
1 TABLET ORAL
Qty: 60 | Refills: 0
Start: 2022-08-18 | End: 2022-09-16

## 2022-08-18 RX ORDER — FUROSEMIDE 40 MG
1 TABLET ORAL
Qty: 60 | Refills: 0
Start: 2022-08-18 | End: 2022-09-16

## 2022-08-18 RX ORDER — APIXABAN 2.5 MG/1
1 TABLET, FILM COATED ORAL
Qty: 60 | Refills: 0
Start: 2022-08-18 | End: 2022-09-16

## 2022-08-18 RX ORDER — FUROSEMIDE 40 MG
40 TABLET ORAL DAILY
Refills: 0 | Status: DISCONTINUED | OUTPATIENT
Start: 2022-08-18 | End: 2022-08-19

## 2022-08-18 RX ADMIN — Medication 40 MILLIGRAM(S): at 13:35

## 2022-08-18 RX ADMIN — Medication 25 MILLIGRAM(S): at 05:11

## 2022-08-18 RX ADMIN — ENOXAPARIN SODIUM 100 MILLIGRAM(S): 100 INJECTION SUBCUTANEOUS at 05:11

## 2022-08-18 RX ADMIN — Medication 200 MILLIGRAM(S): at 13:35

## 2022-08-18 RX ADMIN — Medication 25 MILLIGRAM(S): at 17:48

## 2022-08-18 RX ADMIN — Medication 20 MILLIGRAM(S): at 05:11

## 2022-08-18 RX ADMIN — Medication 200 MILLIGRAM(S): at 05:11

## 2022-08-18 RX ADMIN — ENOXAPARIN SODIUM 100 MILLIGRAM(S): 100 INJECTION SUBCUTANEOUS at 17:48

## 2022-08-18 RX ADMIN — Medication 200 MILLIGRAM(S): at 17:48

## 2022-08-18 NOTE — DISCHARGE NOTE PROVIDER - NSDCMRMEDTOKEN_GEN_ALL_CORE_FT
hydroCHLOROthiazide 25 mg oral tablet: 1 tab(s) orally once a day  metFORMIN 500 mg oral tablet, extended release: 1 tab(s) orally once a day (in the morning)   Eliquis 5 mg oral tablet: 1 tab(s) orally 2 times a day   hydroCHLOROthiazide 25 mg oral tablet: 1 tab(s) orally once a day  Lasix 20 mg oral tablet: 1 tab(s) orally 2 times a day   metFORMIN 500 mg oral tablet, extended release: 1 tab(s) orally once a day (in the morning)  metoprolol tartrate 25 mg oral tablet: 1 tab(s) orally every 12 hours   Eliquis 5 mg oral tablet: 1 tab(s) orally 2 times a day   Klor-Con M20 oral tablet, extended release: 1 tab(s) orally once a day   Lasix 20 mg oral tablet: 2 tab(s) orally 2 times a day   metFORMIN 500 mg oral tablet, extended release: 1 tab(s) orally once a day (in the morning)  metoprolol tartrate 25 mg oral tablet: 1 tab(s) orally every 12 hours

## 2022-08-18 NOTE — DISCHARGE NOTE PROVIDER - HOSPITAL COURSE
FROM ADMISSION H+P:   HPI:  Pt is 77 yo female with PMH of HTN, T2DM, obesity, presents to the ED for DUNCAN. Pt reports 1 to 2 weeks of orthopnea, associated over the last 2 days to DUNCAN, wet cough and LE edema. Pt states normal breathing at rest/sitting position. Admits palpitations 2 nights ago that resolved, and non associated to other symptoms. At the moment of the evaluation pt reports improvement of LE edema after IV med.  Denies dizziness, lightheadedness, fever, chest pain, focal weakness or numbness, abdominal pain.   Denies previous h/o any arrhythmia, Afib, CHF.   As per pt, last PCP visit months ago, EKG and labs done "normal". She is not seeing cardiologist.    ED course:  - Vitals:  /71, HR 82, RR 16, temp 97.6, O2 Sat 96 % at RA  - Labs: WBC 6.71, Hb 11.7 (MCV 78), .  K 3.2, HCO3 36. pBNP 660.  Cr 0.6.  Troponin normal.  Negative RVP and Covid.    - CXR:  pending official reading.   - EKG: afib, normal rate 82 bpm    - s/p Lasix 20mg IVP x1, Lovenox 100mg SC qd, K 40 meq PO.  (16 Aug 2022 22:07)      ---  HOSPITAL COURSE:   Patient was admitted for acute CHF and afib rate controlled. Patient was seen by cardiology and was started on IV lasix 20mg BID and Lopressor 25mg BID. Patient's home medication HCTZ was held inpatient in setting of YOBANI. TTE was ordered which showed _______.   Patient was seen by PT and was recommended home PT.   Patient was medically optimized and clinically improved prior to discharge.     ---  CONSULTANTS:   [facility]     ---  TIME SPENT:  I, the attending physician, was physically present for the key portions of the evaluation and management (E/M) service provided. The total amount of time spent reviewing the hospital notes, laboratory values, imaging findings, assessing/counseling the patient, discussing with consultant physicians, social work, nursing staff was -- minutes     FROM ADMISSION H+P:   HPI:  Pt is 75 yo female with PMH of HTN, T2DM, obesity, presents to the ED for DUNCAN. Pt reports 1 to 2 weeks of orthopnea, associated over the last 2 days to DUNCAN, wet cough and LE edema. Pt states normal breathing at rest/sitting position. Admits palpitations 2 nights ago that resolved, and non associated to other symptoms. At the moment of the evaluation pt reports improvement of LE edema after IV med.  Denies dizziness, lightheadedness, fever, chest pain, focal weakness or numbness, abdominal pain.   Denies previous h/o any arrhythmia, Afib, CHF.   As per pt, last PCP visit months ago, EKG and labs done "normal". She is not seeing cardiologist.    ED course:  - Vitals:  /71, HR 82, RR 16, temp 97.6, O2 Sat 96 % at RA  - Labs: WBC 6.71, Hb 11.7 (MCV 78), .  K 3.2, HCO3 36. pBNP 660.  Cr 0.6.  Troponin normal.  Negative RVP and Covid.    - CXR:  pending official reading.   - EKG: afib, normal rate 82 bpm    - s/p Lasix 20mg IVP x1, Lovenox 100mg SC qd, K 40 meq PO.  (16 Aug 2022 22:07)      ---  HOSPITAL COURSE:   Patient was admitted for acute CHF and afib rate controlled. Patient was seen by cardiology and was started on IV lasix 20mg BID and Lopressor 25mg BID. Lopressor was switched to Toprol 50mg daily on day of discharge. Patient was started on Lovenox 100mg for Afib and switched to Eliquis 5mg BID prior to dc. Patient's home medication HCTZ was held inpatient in setting of YOBANI. TTE was ordered and evaluated by Cardiology.   Patient was seen by PT and was recommended home PT.   Patient was medically optimized and clinically improved prior to discharge.     ---  CONSULTANTS:   [facility]     ---  TIME SPENT:  I, the attending physician, was physically present for the key portions of the evaluation and management (E/M) service provided. The total amount of time spent reviewing the hospital notes, laboratory values, imaging findings, assessing/counseling the patient, discussing with consultant physicians, social work, nursing staff was -- minutes     FROM ADMISSION H+P:   HPI:  Pt is 75 yo female with PMH of HTN, T2DM, obesity, presents to the ED for DUNCAN. Pt reports 1 to 2 weeks of orthopnea, associated over the last 2 days to DUNCAN, wet cough and LE edema. Pt states normal breathing at rest/sitting position. Admits palpitations 2 nights ago that resolved, and non associated to other symptoms. At the moment of the evaluation pt reports improvement of LE edema after IV med.       ---  HOSPITAL COURSE:   Patient was admitted for acute CHF and afib rate controlled. Patient was seen by cardiology and was started on IV lasix 20mg BID and Lopressor 25mg BID. Lopressor was switched to Toprol 50mg daily on day of discharge. Patient was started on Lovenox 100mg for Afib and switched to Eliquis 5mg BID prior to dc. Patient's home medication HCTZ was held inpatient in setting of YOBANI. TTE was ordered and evaluated by Cardiology. Pt will go home on 40mg po lasix bid until following with cardiology.   Patient was seen by PT and was recommended home PT.   Patient was medically optimized and clinically improved prior to discharge.     ---  CONSULTANTS:   [facility]

## 2022-08-18 NOTE — DISCHARGE NOTE PROVIDER - ATTENDING DISCHARGE PHYSICAL EXAMINATION:
Objective:    Vitals:  T(C): 36.8 (08-18-22 @ 11:37), Max: 36.8 (08-18-22 @ 04:20)  HR: 87 (08-18-22 @ 11:37) (73 - 91)  BP: 117/79 (08-18-22 @ 11:37) (107/71 - 123/80)  RR: 18 (08-18-22 @ 11:37) (18 - 18)  SpO2: 91% (08-18-22 @ 11:37) (91% - 93%)    Physical Exam:  General: comfortable, no acute distress, well nourished  HEENT: Atraumatic, no LAD, trachea midline, PERRLA  Cardiovascular: normal s1s2, no murmurs, gallops or fricition rubs  Pulmonary: clear to ausculation Bilaterally, no wheezing , rhonchi  Gastrointestinal: soft non tender non distended, no masses felt, no organomegally  Muscloskeletal: no lower extremity edema, intact bilateral lower extremity pulses  Neurological: CN II-12 intact. No focal weakness  Psychiatrical: normal mood, cooperative  SKIN: no rash, lesions or ulcers

## 2022-08-18 NOTE — PROGRESS NOTE ADULT - SUBJECTIVE AND OBJECTIVE BOX
Patient is a 76y old  Female who presents with a chief complaint of CHF exacerbation (17 Aug 2022 08:41)    INTERVAL HPI/OVERNIGHT EVENTS: Patient has no complaints at this time, resting comfortably in bed, feeling well. Denies fevers, chills, headache, lightheadedness, chest pain, dyspnea, abdominal pain, nausea, vomiting, diarrhea, constipation.  No overnight events occurred.    MEDICATIONS  (STANDING):  dextrose 5%. 1000 milliLiter(s) (50 mL/Hr) IV Continuous <Continuous>  dextrose 5%. 1000 milliLiter(s) (100 mL/Hr) IV Continuous <Continuous>  dextrose 50% Injectable 25 Gram(s) IV Push once  dextrose 50% Injectable 12.5 Gram(s) IV Push once  dextrose 50% Injectable 25 Gram(s) IV Push once  enoxaparin Injectable 100 milliGRAM(s) SubCutaneous every 12 hours  furosemide   Injectable 20 milliGRAM(s) IV Push two times a day  glucagon  Injectable 1 milliGRAM(s) IntraMuscular once  guaiFENesin Oral Liquid (Sugar-Free) 200 milliGRAM(s) Oral every 6 hours  insulin lispro (ADMELOG) corrective regimen sliding scale   SubCutaneous three times a day before meals  insulin lispro (ADMELOG) corrective regimen sliding scale   SubCutaneous at bedtime  metoprolol tartrate 25 milliGRAM(s) Oral every 12 hours    MEDICATIONS  (PRN):  acetaminophen     Tablet .. 650 milliGRAM(s) Oral every 6 hours PRN Temp greater or equal to 38C (100.4F), Mild Pain (1 - 3)  dextrose Oral Gel 15 Gram(s) Oral once PRN Blood Glucose LESS THAN 70 milliGRAM(s)/deciliter  melatonin 3 milliGRAM(s) Oral at bedtime PRN Insomnia    Allergies    No Known Allergies    Intolerances    REVIEW OF SYSTEMS:  CONSTITUTIONAL: No fever or chills  HEENT:  No headache, no sore throat  RESPIRATORY: No cough, wheezing, or shortness of breath  CARDIOVASCULAR: No chest pain, palpitations  GASTROINTESTINAL: No abd pain, nausea, vomiting, or diarrhea  GENITOURINARY: No dysuria, frequency, or hematuria  NEUROLOGICAL: no focal weakness or dizziness  MUSCULOSKELETAL: no myalgias     Vital Signs Last 24 Hrs  T(C): 36.8 (18 Aug 2022 04:20), Max: 36.8 (17 Aug 2022 09:54)  T(F): 98.3 (18 Aug 2022 04:20), Max: 98.3 (17 Aug 2022 09:54)  HR: 91 (18 Aug 2022 04:20) (73 - 91)  BP: 119/79 (18 Aug 2022 04:20) (107/71 - 123/80)  BP(mean): --  RR: 18 (18 Aug 2022 04:20) (18 - 18)  SpO2: 93% (18 Aug 2022 04:20) (90% - 93%)    Parameters below as of 18 Aug 2022 04:20  Patient On (Oxygen Delivery Method): room air    Physical Exam:  GENERAL: no acute distress  RESPIRATORY: CTAB/L, no rhonchi, rales, or wheezing  CARDIOVASCULAR: RRR, no murmurs, gallops, rubs  ABDOMINAL: soft, non-tender, non-distended, positive bowel sounds   EXTREMITIES: no clubbing, cyanosis, b/l le edema  NEUROLOGICAL: alert and oriented x 3, non-focal  SKIN: no rashes or lesions   MUSCULOSKELETAL: no gross joint deformity    LABS:                        11.7   5.72  )-----------( 258      ( 17 Aug 2022 11:20 )             36.9     CBC Full  -  ( 17 Aug 2022 11:20 )  WBC Count : 5.72 K/uL  Hemoglobin : 11.7 g/dL  Hematocrit : 36.9 %  Platelet Count - Automated : 258 K/uL  Mean Cell Volume : 77.8 fl  Mean Cell Hemoglobin : 24.7 pg  Mean Cell Hemoglobin Concentration : 31.7 gm/dL  Auto Neutrophil # : x  Auto Lymphocyte # : x  Auto Monocyte # : x  Auto Eosinophil # : x  Auto Basophil # : x  Auto Neutrophil % : x  Auto Lymphocyte % : x  Auto Monocyte % : x  Auto Eosinophil % : x  Auto Basophil % : x      Ca    10.0       17 Aug 2022 06:50          CAPILLARY BLOOD GLUCOSE      POCT Blood Glucose.: 140 mg/dL (17 Aug 2022 20:46)  POCT Blood Glucose.: 124 mg/dL (17 Aug 2022 17:22)  POCT Blood Glucose.: 136 mg/dL (17 Aug 2022 11:53)  POCT Blood Glucose.: 119 mg/dL (17 Aug 2022 08:38)          RADIOLOGY & ADDITIONAL TESTS:    Personally reviewed.     Consultant(s) Notes Reviewed:  [x] YES  [ ] NO

## 2022-08-18 NOTE — PROGRESS NOTE ADULT - ASSESSMENT
77 yo female with PMH of HTN, T2DM, obesity, presents to the ED for DUNCAN. Pt reports 1 to 2 weeks of orthopnea, associated over the last 2 days to DUNCAN, wet cough and LE edema.  Admitted for acute heart failure and found to have Afib with rate controlled.     - ADHF  -volume  status is improving  -ayala  - Patient with B/L leg edema + dyspnea on exertion  -changed lasix to 40 mg po qd  - strict I/o's and daily weights  - EKG with new onset Afib HR of 88 BPM and on tele monitor a-fib with rates of 90  -can transition lovenox to eliquis 5 bid  - Hx of HTN; Hold home Hydrochlorothiazide    - Monitor and replete lyes Mg>2 and K>4   - Will continue to follow       Gwen Kurtz, MANUEL  [phone] 75 yo female with PMH of HTN, T2DM, obesity, presents to the ED for DUNCAN. Pt reports 1 to 2 weeks of orthopnea, associated over the last 2 days to DUNCAN, wet cough and LE edema.  Admitted for acute heart failure and found to have Afib with rate controlled.     - ADHF  -volume  status is improving  -changed lasix to 40 mg po qd  - strict I/o's and daily weights, net neg 2.5 liters overnight  - EKG with new onset Afib HR of 88 BPM and on tele monitor a-fib with rates of 90  -can transition lovenox to eliquis 5 bid  -continue lopressor bid  - Hx of HTN; Hold home Hydrochlorothiazide    - Monitor and replete lyes Mg>2 and K>4   - Will continue to follow       Gwen Kurtz, MANUEL  [phone] 75 yo female with PMH of HTN, T2DM, obesity, presents to the ED for DUNCAN. Pt reports 1 to 2 weeks of orthopnea, associated over the last 2 days to DUNCAN, wet cough and LE edema.  Admitted for acute heart failure and found to have Afib with rate controlled.     - ADHF  - volume  status is improving  - changed lasix to 40 mg po qd  - strict I/o's and daily weights, net neg 2.5 liters overnight  - EKG with new onset Afib HR of 88 BPM and on tele monitor a-fib with rates of 90  - can transition lovenox to eliquis 5 bid  - continue lopressor bid  - Hx of HTN; Hold home Hydrochlorothiazide    - Monitor and replete lyes Mg>2 and K>4   - Will continue to follow       Gwen Kurtz, MANUEL  [phone]

## 2022-08-18 NOTE — PROGRESS NOTE ADULT - PROBLEM SELECTOR PLAN 2
- No known h/o AFib.  Admits palpitations 2 nights ago that self resolved   - EKG with Afib rate controlled 82 bpm.   - EMMANUEL-DS-VAs score 5  - No contraindications were found for anticoagulation.   - s/p Lovenox 100mg SC x1  - Continue full dose AC Lovenox, change to Eliquis on dc.   - Not BB for now in the setting of rate controlled and new acute CHF.  - Telemetry monitor  - Cards following.

## 2022-08-18 NOTE — PROGRESS NOTE ADULT - PROBLEM SELECTOR PLAN 1
- Pt with 1-2 weeks of orthopnea, and the last 3 days with DUNCAN, LE edema and cough 2/2 acute HF. Less likely respiratory source, denies upper respiratory symptoms, no fever.   - No h/o CHF, not on Lasix at home   - Mild volume overload on exam, s/p Lasix 20mg IVP in the ED   - pBNP 660.   - Negative RVP, normal WBC.   - Continue Lasix 20 mg IVP BID  - TTE ordered  - LE Doppler: Neg  - Monitor I&Os, renal function.   - Cardiology following.

## 2022-08-18 NOTE — PROGRESS NOTE ADULT - SUBJECTIVE AND OBJECTIVE BOX
[facility] -- Shawna Bell, Isaura Jolley, Sg Brown Savella, Goodger  Office # [phone]    Follow Up:    DUNCAN, SOB  Subjective/Observations:   Patient seen and examined. She reports no complaints of chest pain and SOB is improved. She remains with peripheral edema.      REVIEW OF SYSTEMS: All other review of systems is negative unless indicated above  PAST MEDICAL & SURGICAL HISTORY:  Hypertension  Type 2 diabetes mellitus  History of knee replacement  bilateral    MEDICATIONS  (STANDING):  dextrose 5%. 1000 milliLiter(s) (50 mL/Hr) IV Continuous <Continuous>  dextrose 5%. 1000 milliLiter(s) (100 mL/Hr) IV Continuous <Continuous>  dextrose 50% Injectable 25 Gram(s) IV Push once  dextrose 50% Injectable 12.5 Gram(s) IV Push once  dextrose 50% Injectable 25 Gram(s) IV Push once  enoxaparin Injectable 100 milliGRAM(s) SubCutaneous every 12 hours  furosemide   Injectable 20 milliGRAM(s) IV Push two times a day  glucagon  Injectable 1 milliGRAM(s) IntraMuscular once  guaiFENesin Oral Liquid (Sugar-Free) 200 milliGRAM(s) Oral every 6 hours  insulin lispro (ADMELOG) corrective regimen sliding scale   SubCutaneous three times a day before meals  insulin lispro (ADMELOG) corrective regimen sliding scale   SubCutaneous at bedtime  metoprolol tartrate 25 milliGRAM(s) Oral every 12 hours    MEDICATIONS  (PRN):  acetaminophen     Tablet .. 650 milliGRAM(s) Oral every 6 hours PRN Temp greater or equal to 38C (100.4F), Mild Pain (1 - 3)  dextrose Oral Gel 15 Gram(s) Oral once PRN Blood Glucose LESS THAN 70 milliGRAM(s)/deciliter  melatonin 3 milliGRAM(s) Oral at bedtime PRN Insomnia    Allergies    No Known Allergies    Intolerances      Vital Signs Last 24 Hrs  T(C): 36.8 (18 Aug 2022 11:37), Max: 36.8 (18 Aug 2022 04:20)  T(F): 98.3 (18 Aug 2022 11:37), Max: 98.3 (18 Aug 2022 04:20)  HR: 87 (18 Aug 2022 11:37) (73 - 91)  BP: 117/79 (18 Aug 2022 11:37) (107/71 - 123/80)  BP(mean): --  RR: 18 (18 Aug 2022 11:37) (18 - 18)  SpO2: 91% (18 Aug 2022 11:37) (91% - 93%)    Parameters below as of 18 Aug 2022 04:20  Patient On (Oxygen Delivery Method): room air      I&O's Summary    17 Aug 2022 07:01  -  18 Aug 2022 07:00  --------------------------------------------------------  IN: 0 mL / OUT: 800 mL / NET: -800 mL    18 Aug 2022 07:01  -  18 Aug 2022 13:10  --------------------------------------------------------  IN: 0 mL / OUT: 200 mL / NET: -200 mL        PHYSICAL EXAM:  TELE: atrial fibrillation  Constitutional: NAD, awake and alert, well-developed  HEENT: Moist Mucous Membranes, Anicteric  Pulmonary: labored with bibasilar crackles  Cardiovascular: irregular rate and rhtyhm, No murmurs, rubs, gallops or clicks  Gastrointestinal: Bowel Sounds present, soft, nontender.   Lymph: + peripheral edema. No lymphadenopathy.  Skin: No visible rashes or ulcers.  Psych:  Mood & affect appropriate  LABS: All Labs Reviewed:                        12.8   4.79  )-----------( 260      ( 18 Aug 2022 08:20 )             40.6                         11.7   5.72  )-----------( 258      ( 17 Aug 2022 11:20 )             36.9                         11.7   6.71  )-----------( 235      ( 16 Aug 2022 16:08 )             37.3     18 Aug 2022 08:20    136    |  96     |  13     ----------------------------<  102    3.8     |  34     |  0.75   17 Aug 2022 06:50    139    |  96     |  8      ----------------------------<  117    3.5     |  37     |  0.74   16 Aug 2022 16:08    136    |  96     |  8      ----------------------------<  110    3.2     |  36     |  0.60     Ca    9.6        18 Aug 2022 08:20  Ca    10.0       17 Aug 2022 06:50  Ca    10.0       16 Aug 2022 16:08  Phos  3.7       17 Aug 2022 06:50  Mg     1.9       17 Aug 2022 06:50    TPro  7.2    /  Alb  3.0    /  TBili  0.4    /  DBili  x      /  AST  17     /  ALT  26     /  AlkPhos  90     16 Aug 2022 16:08      CARDIAC MARKERS ( 16 Aug 2022 16:08 )  x     / x     / 179 U/L / x     / x

## 2022-08-18 NOTE — DISCHARGE NOTE PROVIDER - CARE PROVIDER_API CALL
Noble Mejia)  Cardiovascular Disease; Internal Medicine  [address], [place] [id number]  Phone: [phone]  Fax: [phone]  Follow Up Time: 2 weeks

## 2022-08-18 NOTE — DISCHARGE NOTE PROVIDER - NSDCCPCAREPLAN_GEN_ALL_CORE_FT
PRINCIPAL DISCHARGE DIAGNOSIS  Diagnosis: New onset atrial fibrillation  Assessment and Plan of Treatment: You were diagnosed with new onset atrial fibrilation. You were given medication in the hospital to help control your heart rate.   Please continue to take ________ after discharge.   Please follow up with cardiology within 2 weeks of discharge.      SECONDARY DISCHARGE DIAGNOSES  Diagnosis: Acute on chronic systolic congestive heart failure  Assessment and Plan of Treatment: You were diagnosed with congestive heart failure. You were seen by cardiology and were started on medication that helped decreased the fluid buildup.   Please continue to take _______ after discharge.   Please follow up with cardiology and your PCP within 2 weeks of discharge.     PRINCIPAL DISCHARGE DIAGNOSIS  Diagnosis: New onset atrial fibrillation  Assessment and Plan of Treatment: You were diagnosed with new onset atrial fibrilation. You were given medication in the hospital to help control your heart rate.   Please continue to take Eliquis after discharge.   Please follow up with cardiology within 2 weeks of discharge.      SECONDARY DISCHARGE DIAGNOSES  Diagnosis: Acute on chronic systolic congestive heart failure  Assessment and Plan of Treatment: You were diagnosed with congestive heart failure. You were seen by cardiology and were started on medication that helped decreased the fluid buildup.   Please continue to take lasix after discharge.   Please follow up with cardiology and your PCP within 2 weeks of discharge.

## 2022-08-19 LAB
ALBUMIN SERPL ELPH-MCNC: 2.9 G/DL — LOW (ref 3.3–5)
ALP SERPL-CCNC: 79 U/L — SIGNIFICANT CHANGE UP (ref 40–120)
ALT FLD-CCNC: 22 U/L — SIGNIFICANT CHANGE UP (ref 12–78)
ANION GAP SERPL CALC-SCNC: 8 MMOL/L — SIGNIFICANT CHANGE UP (ref 5–17)
AST SERPL-CCNC: 23 U/L — SIGNIFICANT CHANGE UP (ref 15–37)
BASOPHILS # BLD AUTO: 0.03 K/UL — SIGNIFICANT CHANGE UP (ref 0–0.2)
BASOPHILS NFR BLD AUTO: 0.8 % — SIGNIFICANT CHANGE UP (ref 0–2)
BILIRUB SERPL-MCNC: 0.6 MG/DL — SIGNIFICANT CHANGE UP (ref 0.2–1.2)
BUN SERPL-MCNC: 20 MG/DL — SIGNIFICANT CHANGE UP (ref 7–23)
CALCIUM SERPL-MCNC: 9.4 MG/DL — SIGNIFICANT CHANGE UP (ref 8.5–10.1)
CHLORIDE SERPL-SCNC: 95 MMOL/L — LOW (ref 96–108)
CO2 SERPL-SCNC: 36 MMOL/L — HIGH (ref 22–31)
CREAT SERPL-MCNC: 0.84 MG/DL — SIGNIFICANT CHANGE UP (ref 0.5–1.3)
EGFR: 72 ML/MIN/1.73M2 — SIGNIFICANT CHANGE UP
EOSINOPHIL # BLD AUTO: 0.19 K/UL — SIGNIFICANT CHANGE UP (ref 0–0.5)
EOSINOPHIL NFR BLD AUTO: 4.9 % — SIGNIFICANT CHANGE UP (ref 0–6)
GLUCOSE SERPL-MCNC: 102 MG/DL — HIGH (ref 70–99)
HCT VFR BLD CALC: 39.9 % — SIGNIFICANT CHANGE UP (ref 34.5–45)
HGB BLD-MCNC: 12.4 G/DL — SIGNIFICANT CHANGE UP (ref 11.5–15.5)
IMM GRANULOCYTES NFR BLD AUTO: 0.5 % — SIGNIFICANT CHANGE UP (ref 0–1.5)
LYMPHOCYTES # BLD AUTO: 1.74 K/UL — SIGNIFICANT CHANGE UP (ref 1–3.3)
LYMPHOCYTES # BLD AUTO: 44.7 % — HIGH (ref 13–44)
MCHC RBC-ENTMCNC: 24.5 PG — LOW (ref 27–34)
MCHC RBC-ENTMCNC: 31.1 GM/DL — LOW (ref 32–36)
MCV RBC AUTO: 78.7 FL — LOW (ref 80–100)
MONOCYTES # BLD AUTO: 0.47 K/UL — SIGNIFICANT CHANGE UP (ref 0–0.9)
MONOCYTES NFR BLD AUTO: 12.1 % — SIGNIFICANT CHANGE UP (ref 2–14)
NEUTROPHILS # BLD AUTO: 1.44 K/UL — LOW (ref 1.8–7.4)
NEUTROPHILS NFR BLD AUTO: 37 % — LOW (ref 43–77)
NRBC # BLD: 0 /100 WBCS — SIGNIFICANT CHANGE UP (ref 0–0)
PLATELET # BLD AUTO: 261 K/UL — SIGNIFICANT CHANGE UP (ref 150–400)
POTASSIUM SERPL-MCNC: 4 MMOL/L — SIGNIFICANT CHANGE UP (ref 3.5–5.3)
POTASSIUM SERPL-SCNC: 4 MMOL/L — SIGNIFICANT CHANGE UP (ref 3.5–5.3)
PROT SERPL-MCNC: 7.3 G/DL — SIGNIFICANT CHANGE UP (ref 6–8.3)
RBC # BLD: 5.07 M/UL — SIGNIFICANT CHANGE UP (ref 3.8–5.2)
RBC # FLD: 15.4 % — HIGH (ref 10.3–14.5)
SODIUM SERPL-SCNC: 139 MMOL/L — SIGNIFICANT CHANGE UP (ref 135–145)
WBC # BLD: 3.89 K/UL — SIGNIFICANT CHANGE UP (ref 3.8–10.5)
WBC # FLD AUTO: 3.89 K/UL — SIGNIFICANT CHANGE UP (ref 3.8–10.5)

## 2022-08-19 PROCEDURE — 99233 SBSQ HOSP IP/OBS HIGH 50: CPT | Mod: GC

## 2022-08-19 PROCEDURE — 99233 SBSQ HOSP IP/OBS HIGH 50: CPT

## 2022-08-19 RX ORDER — FUROSEMIDE 40 MG
20 TABLET ORAL
Refills: 0 | Status: DISCONTINUED | OUTPATIENT
Start: 2022-08-19 | End: 2022-08-20

## 2022-08-19 RX ADMIN — ENOXAPARIN SODIUM 100 MILLIGRAM(S): 100 INJECTION SUBCUTANEOUS at 06:36

## 2022-08-19 RX ADMIN — Medication 20 MILLIGRAM(S): at 17:32

## 2022-08-19 RX ADMIN — Medication 200 MILLIGRAM(S): at 06:35

## 2022-08-19 RX ADMIN — Medication 200 MILLIGRAM(S): at 12:46

## 2022-08-19 RX ADMIN — Medication 25 MILLIGRAM(S): at 06:35

## 2022-08-19 RX ADMIN — Medication 40 MILLIGRAM(S): at 06:35

## 2022-08-19 RX ADMIN — Medication 25 MILLIGRAM(S): at 17:32

## 2022-08-19 RX ADMIN — Medication 200 MILLIGRAM(S): at 17:31

## 2022-08-19 RX ADMIN — ENOXAPARIN SODIUM 100 MILLIGRAM(S): 100 INJECTION SUBCUTANEOUS at 17:32

## 2022-08-19 RX ADMIN — Medication 200 MILLIGRAM(S): at 21:37

## 2022-08-19 NOTE — PROGRESS NOTE ADULT - SUBJECTIVE AND OBJECTIVE BOX
Last vitals:   Vitals:    03/02/19 0056   BP: (!) 135/93   Pulse: (!) 112   Resp: 18   Temp: 36.4  C (97.6  F)   SpO2: 100%     Patient's level of consciousness is drowsy  Spontaneous respirations: yes   Maintains airway independently: yes  Dentition unchanged: yes  Oropharynx: oropharynx clear of all foreign objects    QCDR Measures:  ASA# 20 - Surgical Safety Checklist: WHO surgical safety checklist completed prior to induction    PQRS# 430 - Adult PONV Prevention: 4558F-8P - Pt did NOT receive => 2 anti-emetic agents  ASA# 8 - Peds PONV Prevention: NA - Not pediatric patient, not GA or 2 or more risk factors NOT present  PQRS# 424 - Yasmeen-op Temp Management: 4559F - At least one body temp DOCUMENTED => 35.5C or 95.9F within required timeframe  PQRS# 426 - PACU Transfer Protocol: - Transfer of care checklist used  ASA# 14 - Acute Post-op Pain: ASA14B - Patient did NOT experience pain >= 7 out of 10   [facility] -- Shawna Bell, Isaura Jolley, Sg Brown Savella, Goodger  Office # [phone]    Follow Up:  DUNCAN, SOB Hx Afib    Subjective/Observations: Seen and evaluated, tolerating RA.  However, still c/o orthopnea and DUNCAN.  Admits to dry cough.  Denies CP or palpitations    REVIEW OF SYSTEMS: All other review of systems is negative unless indicated above  PAST MEDICAL & SURGICAL HISTORY:  Hypertension  Type 2 diabetes mellitus  History of knee replacement  bilateral    MEDICATIONS  (STANDING):  dextrose 5%. 1000 milliLiter(s) (50 mL/Hr) IV Continuous <Continuous>  dextrose 5%. 1000 milliLiter(s) (100 mL/Hr) IV Continuous <Continuous>  dextrose 50% Injectable 25 Gram(s) IV Push once  dextrose 50% Injectable 12.5 Gram(s) IV Push once  dextrose 50% Injectable 25 Gram(s) IV Push once  enoxaparin Injectable 100 milliGRAM(s) SubCutaneous every 12 hours  furosemide    Tablet 40 milliGRAM(s) Oral daily  glucagon  Injectable 1 milliGRAM(s) IntraMuscular once  guaiFENesin Oral Liquid (Sugar-Free) 200 milliGRAM(s) Oral every 6 hours  insulin lispro (ADMELOG) corrective regimen sliding scale   SubCutaneous three times a day before meals  insulin lispro (ADMELOG) corrective regimen sliding scale   SubCutaneous at bedtime  metoprolol tartrate 25 milliGRAM(s) Oral every 12 hours    MEDICATIONS  (PRN):  acetaminophen     Tablet .. 650 milliGRAM(s) Oral every 6 hours PRN Temp greater or equal to 38C (100.4F), Mild Pain (1 - 3)  dextrose Oral Gel 15 Gram(s) Oral once PRN Blood Glucose LESS THAN 70 milliGRAM(s)/deciliter  melatonin 3 milliGRAM(s) Oral at bedtime PRN Insomnia    Allergies    No Known Allergies    Intolerances    Vital Signs Last 24 Hrs  T(C): 36.7 (19 Aug 2022 04:45), Max: 36.8 (18 Aug 2022 20:29)  T(F): 98.1 (19 Aug 2022 04:45), Max: 98.3 (18 Aug 2022 20:29)  HR: 91 (19 Aug 2022 04:45) (74 - 91)  BP: 109/76 (19 Aug 2022 04:45) (109/76 - 115/80)  BP(mean): --  RR: 18 (19 Aug 2022 04:45) (18 - 18)  SpO2: 93% (19 Aug 2022 04:45) (93% - 94%)    Parameters below as of 19 Aug 2022 04:45  Patient On (Oxygen Delivery Method): room air    I&O's Summary    18 Aug 2022 07:01  -  19 Aug 2022 07:00  --------------------------------------------------------  IN: 0 mL / OUT: 200 mL / NET: -200 mL      PHYSICAL EXAM:  TELE: Afib  Constitutional: NAD, awake and alert, obese  HEENT: Moist Mucous Membranes, Anicteric  Pulmonary: Non-labored, breath sounds are diminished at bases bilaterally, No wheezing, + fine rales at bases no rhonchi  Cardiovascular: IRRR, S1 and S2, +murmurs, no rubs, gallops or clicks  Gastrointestinal: Bowel Sounds present, soft, nontender.   Lymph: No peripheral edema. No lymphadenopathy.  Skin: No visible rashes or ulcers.  Psych:  Mood & affect: Flat  LABS: All Labs Reviewed:                        12.4   3.89  )-----------( 261      ( 19 Aug 2022 06:45 )             39.9                         12.8   4.79  )-----------( 260      ( 18 Aug 2022 08:20 )             40.6                         11.7   5.72  )-----------( 258      ( 17 Aug 2022 11:20 )             36.9     19 Aug 2022 06:45    139    |  95     |  20     ----------------------------<  102    4.0     |  36     |  0.84   18 Aug 2022 08:20    136    |  96     |  13     ----------------------------<  102    3.8     |  34     |  0.75   17 Aug 2022 06:50    139    |  96     |  8      ----------------------------<  117    3.5     |  37     |  0.74     Ca    9.4        19 Aug 2022 06:45  Ca    9.6        18 Aug 2022 08:20  Ca    10.0       17 Aug 2022 06:50  Phos  3.7       17 Aug 2022 06:50  Mg     1.9       17 Aug 2022 06:50    TPro  7.3    /  Alb  2.9    /  TBili  0.6    /  DBili  x      /  AST  23     /  ALT  22     /  AlkPhos  79     19 Aug 2022 06:45  TPro  7.2    /  Alb  3.0    /  TBili  0.4    /  DBili  x      /  AST  17     /  ALT  26     /  AlkPhos  90     16 Aug 2022 16:08    TTE pending    ACC: [id number] EXAM:  XR CHEST PORTABLE URGENT 1V                          PROCEDURE DATE:  08/16/2022      INTERPRETATION:  Procedure: Chest x-ray    History: Shortness of breath    Comment:  Frontal view of the chest was obtained. The cardiac silhouette   is prominent.  The lungs are clear without infiltrate, effusion, or   pneumothorax. The osseous structures are unremarkable.    Impression:  1. Clear lungs    --- End of Report ---    MICHELL MCMULLEN MD; Attending Radiologist  This document has been electronically signed. Aug 18 2022  2:40PM    Ventricular Rate 82 BPM    QRS Duration 70 ms    Q-T Interval 290 ms    QTC Calculation(Bazett) 338 ms    R Axis -2 degrees    T Axis -11 degrees    Diagnosis Line Atrial fibrillation  Inferior infarct , age undetermined  Abnormal ECG  No previous ECGs available  Confirmed by alfredo Mejia ([id number]) on 8/17/2022 2:39:30 PM

## 2022-08-19 NOTE — PROGRESS NOTE ADULT - SUBJECTIVE AND OBJECTIVE BOX
Patient is a 76y old  Female who presents with a chief complaint of CHF exacerbation    INTERVAL HPI/OVERNIGHT EVENTS: Patient has no complaints at this time, resting comfortably in bed, feeling well. Denies fevers, chills, headache, lightheadedness, chest pain, dyspnea, abdominal pain, nausea, vomiting, diarrhea, constipation.  No overnight events occurred.    MEDICATIONS  (STANDING):  dextrose 5%. 1000 milliLiter(s) (50 mL/Hr) IV Continuous <Continuous>  dextrose 5%. 1000 milliLiter(s) (100 mL/Hr) IV Continuous <Continuous>  dextrose 50% Injectable 25 Gram(s) IV Push once  dextrose 50% Injectable 12.5 Gram(s) IV Push once  dextrose 50% Injectable 25 Gram(s) IV Push once  enoxaparin Injectable 100 milliGRAM(s) SubCutaneous every 12 hours  furosemide   Injectable 20 milliGRAM(s) IV Push two times a day  glucagon  Injectable 1 milliGRAM(s) IntraMuscular once  guaiFENesin Oral Liquid (Sugar-Free) 200 milliGRAM(s) Oral every 6 hours  insulin lispro (ADMELOG) corrective regimen sliding scale   SubCutaneous three times a day before meals  insulin lispro (ADMELOG) corrective regimen sliding scale   SubCutaneous at bedtime  metoprolol tartrate 25 milliGRAM(s) Oral every 12 hours    MEDICATIONS  (PRN):  acetaminophen     Tablet .. 650 milliGRAM(s) Oral every 6 hours PRN Temp greater or equal to 38C (100.4F), Mild Pain (1 - 3)  dextrose Oral Gel 15 Gram(s) Oral once PRN Blood Glucose LESS THAN 70 milliGRAM(s)/deciliter  melatonin 3 milliGRAM(s) Oral at bedtime PRN Insomnia    Allergies    No Known Allergies    Intolerances    REVIEW OF SYSTEMS:  CONSTITUTIONAL: No fever or chills  HEENT:  No headache, no sore throat  RESPIRATORY: No cough, wheezing, or shortness of breath  CARDIOVASCULAR: No chest pain, palpitations  GASTROINTESTINAL: No abd pain, nausea, vomiting, or diarrhea  GENITOURINARY: No dysuria, frequency, or hematuria  NEUROLOGICAL: no focal weakness or dizziness  MUSCULOSKELETAL: no myalgias     LABS:                        12.4   3.89  )-----------( 261      ( 19 Aug 2022 06:45 )             39.9     08-19    139  |  95<L>  |  20  ----------------------------<  102<H>  4.0   |  36<H>  |  0.84    Ca    9.4      19 Aug 2022 06:45    TPro  x   /  Alb  2.9<L>  /  TBili  x   /  DBili  x   /  AST  23  /  ALT  22  /  AlkPhos  x   08-19    VITAL SIGNS:  T(C): 36.7 (08-19-22 @ 04:45), Max: 36.8 (08-18-22 @ 11:37)  HR: 91 (08-19-22 @ 04:45) (74 - 91)  BP: 109/76 (08-19-22 @ 04:45) (109/76 - 117/79)  RR: 18 (08-19-22 @ 04:45) (18 - 18)  SpO2: 93% (08-19-22 @ 04:45) (91% - 94%)    Physical Exam:  GENERAL: no acute distress  RESPIRATORY: CTAB/L, no rhonchi, rales, or wheezing  CARDIOVASCULAR: RRR, no murmurs, gallops, rubs  ABDOMINAL: soft, non-tender, non-distended, positive bowel sounds   EXTREMITIES: no clubbing, cyanosis, b/l le edema  NEUROLOGICAL: alert and oriented x 3, non-focal  SKIN: no rashes or lesions   MUSCULOSKELETAL: no gross joint deformity      CAPILLARY BLOOD GLUCOSE      POCT Blood Glucose.: 140 mg/dL (17 Aug 2022 20:46)  POCT Blood Glucose.: 124 mg/dL (17 Aug 2022 17:22)  POCT Blood Glucose.: 136 mg/dL (17 Aug 2022 11:53)  POCT Blood Glucose.: 119 mg/dL (17 Aug 2022 08:38)      RADIOLOGY & ADDITIONAL TESTS:    Personally reviewed.     Consultant(s) Notes Reviewed:  [x] YES  [ ] NO       Patient is a 76y old  Female who presents with a chief complaint of CHF exacerbation    INTERVAL HPI/OVERNIGHT EVENTS: Patient has no complaints at this time, resting comfortably in bed, feeling well. Pt does report cough w/o phlegm that started yesterday. Denies fevers, chills, headache, lightheadedness, chest pain, dyspnea, abdominal pain, nausea, vomiting, diarrhea, constipation.  No overnight events occurred.    MEDICATIONS  (STANDING):  dextrose 5%. 1000 milliLiter(s) (50 mL/Hr) IV Continuous <Continuous>  dextrose 5%. 1000 milliLiter(s) (100 mL/Hr) IV Continuous <Continuous>  dextrose 50% Injectable 25 Gram(s) IV Push once  dextrose 50% Injectable 12.5 Gram(s) IV Push once  dextrose 50% Injectable 25 Gram(s) IV Push once  enoxaparin Injectable 100 milliGRAM(s) SubCutaneous every 12 hours  furosemide   Injectable 20 milliGRAM(s) IV Push two times a day  glucagon  Injectable 1 milliGRAM(s) IntraMuscular once  guaiFENesin Oral Liquid (Sugar-Free) 200 milliGRAM(s) Oral every 6 hours  insulin lispro (ADMELOG) corrective regimen sliding scale   SubCutaneous three times a day before meals  insulin lispro (ADMELOG) corrective regimen sliding scale   SubCutaneous at bedtime  metoprolol tartrate 25 milliGRAM(s) Oral every 12 hours    MEDICATIONS  (PRN):  acetaminophen     Tablet .. 650 milliGRAM(s) Oral every 6 hours PRN Temp greater or equal to 38C (100.4F), Mild Pain (1 - 3)  dextrose Oral Gel 15 Gram(s) Oral once PRN Blood Glucose LESS THAN 70 milliGRAM(s)/deciliter  melatonin 3 milliGRAM(s) Oral at bedtime PRN Insomnia    Allergies    No Known Allergies    Intolerances    REVIEW OF SYSTEMS:  CONSTITUTIONAL: No fever or chills  HEENT:  No headache, no sore throat  RESPIRATORY: No cough, wheezing, or shortness of breath  CARDIOVASCULAR: No chest pain, palpitations  GASTROINTESTINAL: No abd pain, nausea, vomiting, or diarrhea  GENITOURINARY: No dysuria, frequency, or hematuria  NEUROLOGICAL: no focal weakness or dizziness  MUSCULOSKELETAL: no myalgias     LABS:                        12.4   3.89  )-----------( 261      ( 19 Aug 2022 06:45 )             39.9     08-19    139  |  95<L>  |  20  ----------------------------<  102<H>  4.0   |  36<H>  |  0.84    Ca    9.4      19 Aug 2022 06:45    TPro  x   /  Alb  2.9<L>  /  TBili  x   /  DBili  x   /  AST  23  /  ALT  22  /  AlkPhos  x   08-19    VITAL SIGNS:  T(C): 36.7 (08-19-22 @ 04:45), Max: 36.8 (08-18-22 @ 11:37)  HR: 91 (08-19-22 @ 04:45) (74 - 91)  BP: 109/76 (08-19-22 @ 04:45) (109/76 - 117/79)  RR: 18 (08-19-22 @ 04:45) (18 - 18)  SpO2: 93% (08-19-22 @ 04:45) (91% - 94%)    Physical Exam:  GENERAL: no acute distress  RESPIRATORY: CTAB/L, no rhonchi, rales, or wheezing  CARDIOVASCULAR: RRR, no murmurs, gallops, rubs  ABDOMINAL: soft, non-tender, non-distended, positive bowel sounds   EXTREMITIES: no clubbing, cyanosis, b/l le edema  NEUROLOGICAL: alert and oriented x 3, non-focal  SKIN: no rashes or lesions   MUSCULOSKELETAL: no gross joint deformity      CAPILLARY BLOOD GLUCOSE      POCT Blood Glucose.: 140 mg/dL (17 Aug 2022 20:46)  POCT Blood Glucose.: 124 mg/dL (17 Aug 2022 17:22)  POCT Blood Glucose.: 136 mg/dL (17 Aug 2022 11:53)  POCT Blood Glucose.: 119 mg/dL (17 Aug 2022 08:38)      RADIOLOGY & ADDITIONAL TESTS:    Personally reviewed.     Consultant(s) Notes Reviewed:  [x] YES  [ ] NO

## 2022-08-19 NOTE — PROGRESS NOTE ADULT - ASSESSMENT
75 yo female with PMH of HTN, T2DM, obesity, presents to the ED for DUNCAN. Pt reports 1 to 2 weeks of orthopnea, associated over the last 2 days to DUNCAN, wet cough and LE edema.  Admitted for acute heart failure and found to have Afib with rate controlled.     ADHF  - Volume status is improving, tolerating Ra, though, still with fine rales at bases on exam.  C/O DUNCAN.  Edema resolved  - takes HCTZ 25 mg and Lasix 20 mg at home.    - for now, would switch PO Lasix to IV until creatinine bumps up  - Continue to monitor strict I/o's and daily weights  - Follow up TTE    - EKG with new onset Afib HR of 88 BPM and on tele monitor a-fib with rates of 90  - Would switch Lovenox to Eliquis 5 bid  - Switch Lopressor to Toprol XL 50 mg starting 8/20    - BP is stable and controlles.  Okay to hold home Hydrochlorothiazide for now    - Monitor and replete lyes Mg>2 and K>4   - Will continue to follow     Shaista Lunsford DNP, NP-C  Cardiology   Spectra #[phone]

## 2022-08-19 NOTE — PROGRESS NOTE ADULT - ATTENDING COMMENTS
The patient was seen and evaluated independently by the attending physician.  - I have personally reviewed the pt's labs, imaging, micro data and consultant recommendations.    #acute chf  #afib, rate controlled  #diabetes    - on room air  - change back to IV lasix due to persistent edema and reduced exercise tolerance  - spoke w/ cardio  - spoke w/ family over th ephone while at bedside, all in agreement  - suspect dc over the weekend

## 2022-08-20 VITALS — SYSTOLIC BLOOD PRESSURE: 116 MMHG | DIASTOLIC BLOOD PRESSURE: 80 MMHG | HEART RATE: 79 BPM

## 2022-08-20 LAB
ALBUMIN SERPL ELPH-MCNC: 3.2 G/DL — LOW (ref 3.3–5)
ALP SERPL-CCNC: 80 U/L — SIGNIFICANT CHANGE UP (ref 40–120)
ALT FLD-CCNC: 34 U/L — SIGNIFICANT CHANGE UP (ref 12–78)
ANION GAP SERPL CALC-SCNC: 6 MMOL/L — SIGNIFICANT CHANGE UP (ref 5–17)
AST SERPL-CCNC: 38 U/L — HIGH (ref 15–37)
BASOPHILS # BLD AUTO: 0.03 K/UL — SIGNIFICANT CHANGE UP (ref 0–0.2)
BASOPHILS NFR BLD AUTO: 0.8 % — SIGNIFICANT CHANGE UP (ref 0–2)
BILIRUB SERPL-MCNC: 0.4 MG/DL — SIGNIFICANT CHANGE UP (ref 0.2–1.2)
BUN SERPL-MCNC: 22 MG/DL — SIGNIFICANT CHANGE UP (ref 7–23)
CALCIUM SERPL-MCNC: 9.4 MG/DL — SIGNIFICANT CHANGE UP (ref 8.5–10.1)
CHLORIDE SERPL-SCNC: 96 MMOL/L — SIGNIFICANT CHANGE UP (ref 96–108)
CO2 SERPL-SCNC: 36 MMOL/L — HIGH (ref 22–31)
CREAT SERPL-MCNC: 0.81 MG/DL — SIGNIFICANT CHANGE UP (ref 0.5–1.3)
EGFR: 75 ML/MIN/1.73M2 — SIGNIFICANT CHANGE UP
EOSINOPHIL # BLD AUTO: 0.22 K/UL — SIGNIFICANT CHANGE UP (ref 0–0.5)
EOSINOPHIL NFR BLD AUTO: 6 % — SIGNIFICANT CHANGE UP (ref 0–6)
GLUCOSE SERPL-MCNC: 113 MG/DL — HIGH (ref 70–99)
HCT VFR BLD CALC: 39.5 % — SIGNIFICANT CHANGE UP (ref 34.5–45)
HGB BLD-MCNC: 12.5 G/DL — SIGNIFICANT CHANGE UP (ref 11.5–15.5)
IMM GRANULOCYTES NFR BLD AUTO: 0.3 % — SIGNIFICANT CHANGE UP (ref 0–1.5)
LYMPHOCYTES # BLD AUTO: 1.64 K/UL — SIGNIFICANT CHANGE UP (ref 1–3.3)
LYMPHOCYTES # BLD AUTO: 44.9 % — HIGH (ref 13–44)
MCHC RBC-ENTMCNC: 24.9 PG — LOW (ref 27–34)
MCHC RBC-ENTMCNC: 31.6 GM/DL — LOW (ref 32–36)
MCV RBC AUTO: 78.5 FL — LOW (ref 80–100)
MONOCYTES # BLD AUTO: 0.48 K/UL — SIGNIFICANT CHANGE UP (ref 0–0.9)
MONOCYTES NFR BLD AUTO: 13.2 % — SIGNIFICANT CHANGE UP (ref 2–14)
NEUTROPHILS # BLD AUTO: 1.27 K/UL — LOW (ref 1.8–7.4)
NEUTROPHILS NFR BLD AUTO: 34.8 % — LOW (ref 43–77)
NRBC # BLD: 0 /100 WBCS — SIGNIFICANT CHANGE UP (ref 0–0)
PLATELET # BLD AUTO: 302 K/UL — SIGNIFICANT CHANGE UP (ref 150–400)
POTASSIUM SERPL-MCNC: 3.7 MMOL/L — SIGNIFICANT CHANGE UP (ref 3.5–5.3)
POTASSIUM SERPL-SCNC: 3.7 MMOL/L — SIGNIFICANT CHANGE UP (ref 3.5–5.3)
PROT SERPL-MCNC: 7.8 G/DL — SIGNIFICANT CHANGE UP (ref 6–8.3)
RBC # BLD: 5.03 M/UL — SIGNIFICANT CHANGE UP (ref 3.8–5.2)
RBC # FLD: 15.4 % — HIGH (ref 10.3–14.5)
SODIUM SERPL-SCNC: 138 MMOL/L — SIGNIFICANT CHANGE UP (ref 135–145)
WBC # BLD: 3.65 K/UL — LOW (ref 3.8–10.5)
WBC # FLD AUTO: 3.65 K/UL — LOW (ref 3.8–10.5)

## 2022-08-20 PROCEDURE — 83880 ASSAY OF NATRIURETIC PEPTIDE: CPT

## 2022-08-20 PROCEDURE — 80053 COMPREHEN METABOLIC PANEL: CPT

## 2022-08-20 PROCEDURE — 93005 ELECTROCARDIOGRAM TRACING: CPT

## 2022-08-20 PROCEDURE — 36415 COLL VENOUS BLD VENIPUNCTURE: CPT

## 2022-08-20 PROCEDURE — 99232 SBSQ HOSP IP/OBS MODERATE 35: CPT

## 2022-08-20 PROCEDURE — 0225U NFCT DS DNA&RNA 21 SARSCOV2: CPT

## 2022-08-20 PROCEDURE — 80048 BASIC METABOLIC PNL TOTAL CA: CPT

## 2022-08-20 PROCEDURE — 93970 EXTREMITY STUDY: CPT

## 2022-08-20 PROCEDURE — 97116 GAIT TRAINING THERAPY: CPT

## 2022-08-20 PROCEDURE — 83036 HEMOGLOBIN GLYCOSYLATED A1C: CPT

## 2022-08-20 PROCEDURE — 84484 ASSAY OF TROPONIN QUANT: CPT

## 2022-08-20 PROCEDURE — 84100 ASSAY OF PHOSPHORUS: CPT

## 2022-08-20 PROCEDURE — 85025 COMPLETE CBC W/AUTO DIFF WBC: CPT

## 2022-08-20 PROCEDURE — 99239 HOSP IP/OBS DSCHRG MGMT >30: CPT

## 2022-08-20 PROCEDURE — 82962 GLUCOSE BLOOD TEST: CPT

## 2022-08-20 PROCEDURE — 97162 PT EVAL MOD COMPLEX 30 MIN: CPT

## 2022-08-20 PROCEDURE — 93306 TTE W/DOPPLER COMPLETE: CPT

## 2022-08-20 PROCEDURE — 82550 ASSAY OF CK (CPK): CPT

## 2022-08-20 PROCEDURE — 71045 X-RAY EXAM CHEST 1 VIEW: CPT

## 2022-08-20 PROCEDURE — 99285 EMERGENCY DEPT VISIT HI MDM: CPT | Mod: 25

## 2022-08-20 PROCEDURE — 83735 ASSAY OF MAGNESIUM: CPT

## 2022-08-20 PROCEDURE — 85027 COMPLETE CBC AUTOMATED: CPT

## 2022-08-20 PROCEDURE — 86803 HEPATITIS C AB TEST: CPT

## 2022-08-20 RX ORDER — FUROSEMIDE 40 MG
2 TABLET ORAL
Qty: 120 | Refills: 0
Start: 2022-08-20 | End: 2022-09-18

## 2022-08-20 RX ORDER — APIXABAN 2.5 MG/1
5 TABLET, FILM COATED ORAL EVERY 12 HOURS
Refills: 0 | Status: DISCONTINUED | OUTPATIENT
Start: 2022-08-20 | End: 2022-08-20

## 2022-08-20 RX ORDER — POTASSIUM CHLORIDE 20 MEQ
1 PACKET (EA) ORAL
Qty: 30 | Refills: 0
Start: 2022-08-20 | End: 2022-09-18

## 2022-08-20 RX ORDER — METOPROLOL TARTRATE 50 MG
50 TABLET ORAL DAILY
Refills: 0 | Status: DISCONTINUED | OUTPATIENT
Start: 2022-08-20 | End: 2022-08-20

## 2022-08-20 RX ADMIN — Medication 20 MILLIGRAM(S): at 05:23

## 2022-08-20 RX ADMIN — ENOXAPARIN SODIUM 100 MILLIGRAM(S): 100 INJECTION SUBCUTANEOUS at 05:23

## 2022-08-20 RX ADMIN — Medication 25 MILLIGRAM(S): at 05:23

## 2022-08-20 RX ADMIN — Medication 200 MILLIGRAM(S): at 12:12

## 2022-08-20 RX ADMIN — Medication 1: at 12:12

## 2022-08-20 RX ADMIN — Medication 20 MILLIGRAM(S): at 13:58

## 2022-08-20 RX ADMIN — Medication 200 MILLIGRAM(S): at 05:23

## 2022-08-20 NOTE — PROGRESS NOTE ADULT - SUBJECTIVE AND OBJECTIVE BOX
Patient is a 76y old  Female who presents with a chief complaint of CHF exacerbation    INTERVAL HPI/OVERNIGHT EVENTS: Patient has no complaints at this time, resting comfortably in bed, feeling well. Pt reports cough has improved. Denies fevers, chills, headache, lightheadedness, chest pain, dyspnea, abdominal pain, nausea, vomiting, diarrhea, constipation.  No overnight events occurred.    MEDICATIONS  (STANDING):  dextrose 5%. 1000 milliLiter(s) (50 mL/Hr) IV Continuous <Continuous>  dextrose 5%. 1000 milliLiter(s) (100 mL/Hr) IV Continuous <Continuous>  dextrose 50% Injectable 25 Gram(s) IV Push once  dextrose 50% Injectable 12.5 Gram(s) IV Push once  dextrose 50% Injectable 25 Gram(s) IV Push once  enoxaparin Injectable 100 milliGRAM(s) SubCutaneous every 12 hours  furosemide   Injectable 20 milliGRAM(s) IV Push two times a day  glucagon  Injectable 1 milliGRAM(s) IntraMuscular once  guaiFENesin Oral Liquid (Sugar-Free) 200 milliGRAM(s) Oral every 6 hours  insulin lispro (ADMELOG) corrective regimen sliding scale   SubCutaneous three times a day before meals  insulin lispro (ADMELOG) corrective regimen sliding scale   SubCutaneous at bedtime  metoprolol tartrate 25 milliGRAM(s) Oral every 12 hours    MEDICATIONS  (PRN):  acetaminophen     Tablet .. 650 milliGRAM(s) Oral every 6 hours PRN Temp greater or equal to 38C (100.4F), Mild Pain (1 - 3)  dextrose Oral Gel 15 Gram(s) Oral once PRN Blood Glucose LESS THAN 70 milliGRAM(s)/deciliter  melatonin 3 milliGRAM(s) Oral at bedtime PRN Insomnia    Allergies    No Known Allergies    Intolerances    REVIEW OF SYSTEMS:  CONSTITUTIONAL: No fever or chills  HEENT:  No headache, no sore throat  RESPIRATORY: No cough, wheezing, or shortness of breath  CARDIOVASCULAR: No chest pain, palpitations  GASTROINTESTINAL: No abd pain, nausea, vomiting, or diarrhea  GENITOURINARY: No dysuria, frequency, or hematuria  NEUROLOGICAL: no focal weakness or dizziness  MUSCULOSKELETAL: no myalgias     LABS:                        12.5   3.65  )-----------( 302      ( 20 Aug 2022 06:35 )             39.5     08-20    138  |  96  |  22  ----------------------------<  113<H>  3.7   |  36<H>  |  0.81    Ca    9.4      20 Aug 2022 06:35    TPro  7.8  /  Alb  3.2<L>  /  TBili  0.4  /  DBili  x   /  AST  38<H>  /  ALT  34  /  AlkPhos  80  08-20    VITAL SIGNS:  T(C): 36.9 (08-20-22 @ 04:45), Max: 36.9 (08-20-22 @ 04:45)  HR: 88 (08-20-22 @ 04:45) (88 - 98)  BP: 108/71 (08-20-22 @ 04:45) (100/69 - 122/81)  RR: 17 (08-20-22 @ 04:45) (17 - 18)  SpO2: 91% (08-20-22 @ 04:45) (91% - 93%)    Physical Exam:  GENERAL: no acute distress  RESPIRATORY: CTAB/L, no rhonchi, rales, or wheezing  CARDIOVASCULAR: RRR, no murmurs, gallops, rubs  ABDOMINAL: soft, non-tender, non-distended, positive bowel sounds   EXTREMITIES: no clubbing, cyanosis, b/l le edema  NEUROLOGICAL: alert and oriented x 3, non-focal  SKIN: no rashes or lesions   MUSCULOSKELETAL: no gross joint deformity      CAPILLARY BLOOD GLUCOSE      POCT Blood Glucose.: 140 mg/dL (17 Aug 2022 20:46)  POCT Blood Glucose.: 124 mg/dL (17 Aug 2022 17:22)  POCT Blood Glucose.: 136 mg/dL (17 Aug 2022 11:53)  POCT Blood Glucose.: 119 mg/dL (17 Aug 2022 08:38)      RADIOLOGY & ADDITIONAL TESTS:    Personally reviewed.     Consultant(s) Notes Reviewed:  [x] YES  [ ] NO

## 2022-08-20 NOTE — PROGRESS NOTE ADULT - PROBLEM SELECTOR PLAN 3
- K on admission 3.2  - s/p replacement in the ED   - Monitor renal function and electrolytes.

## 2022-08-20 NOTE — PROGRESS NOTE ADULT - SUBJECTIVE AND OBJECTIVE BOX
[facility] -- Kevin Delarosa Patel, Savella, Goodger  Office # [phone]    Follow Up:  Volume overload, Afib    Subjective/Observations: Sitting on the chair, tolerating RA.  Denies SOB or DUNCAN.  Denies CP or palpitations.  No overnight tele events    REVIEW OF SYSTEMS: All other review of systems is negative unless indicated above  PAST MEDICAL & SURGICAL HISTORY:  Hypertension  Type 2 diabetes mellitus  History of knee replacement  bilateral    MEDICATIONS  (STANDING):  apixaban 5 milliGRAM(s) Oral every 12 hours  dextrose 5%. 1000 milliLiter(s) (50 mL/Hr) IV Continuous <Continuous>  dextrose 5%. 1000 milliLiter(s) (100 mL/Hr) IV Continuous <Continuous>  dextrose 50% Injectable 25 Gram(s) IV Push once  dextrose 50% Injectable 12.5 Gram(s) IV Push once  dextrose 50% Injectable 25 Gram(s) IV Push once  furosemide   Injectable 20 milliGRAM(s) IV Push two times a day  glucagon  Injectable 1 milliGRAM(s) IntraMuscular once  guaiFENesin Oral Liquid (Sugar-Free) 200 milliGRAM(s) Oral every 6 hours  insulin lispro (ADMELOG) corrective regimen sliding scale   SubCutaneous three times a day before meals  insulin lispro (ADMELOG) corrective regimen sliding scale   SubCutaneous at bedtime  metoprolol succinate ER 50 milliGRAM(s) Oral daily    MEDICATIONS  (PRN):  acetaminophen     Tablet .. 650 milliGRAM(s) Oral every 6 hours PRN Temp greater or equal to 38C (100.4F), Mild Pain (1 - 3)  dextrose Oral Gel 15 Gram(s) Oral once PRN Blood Glucose LESS THAN 70 milliGRAM(s)/deciliter  melatonin 3 milliGRAM(s) Oral at bedtime PRN Insomnia    Allergies    No Known Allergies    Intolerances    Vital Signs Last 24 Hrs  T(C): 36.9 (20 Aug 2022 04:45), Max: 36.9 (20 Aug 2022 04:45)  T(F): 98.5 (20 Aug 2022 04:45), Max: 98.5 (20 Aug 2022 04:45)  HR: 88 (20 Aug 2022 04:45) (88 - 98)  BP: 108/71 (20 Aug 2022 04:45) (100/69 - 122/81)  BP(mean): --  RR: 17 (20 Aug 2022 04:45) (17 - 18)  SpO2: 91% (20 Aug 2022 04:45) (91% - 93%)    Parameters below as of 20 Aug 2022 04:45  Patient On (Oxygen Delivery Method): room air      I&O's Summary    19 Aug 2022 07:01  -  20 Aug 2022 07:00  --------------------------------------------------------  IN: 300 mL / OUT: 0 mL / NET: 300 mL      PHYSICAL EXAM:  TELE: Afib  Constitutional: NAD, awake and alert, well-developed  HEENT: Moist Mucous Membranes, Anicteric  Pulmonary: Non-labored, breath sounds are diminished bilaterally, No wheezing, rales or rhonchi  Cardiovascular: IRRR, S1 and S2, No murmurs, rubs, gallops or clicks  Gastrointestinal: Bowel Sounds present, soft, nontender.   Lymph: No peripheral edema. No lymphadenopathy.  Skin: No visible rashes or ulcers.  Psych:  Mood & affect appropriate  LABS: All Labs Reviewed:                        12.5   3.65  )-----------( 302      ( 20 Aug 2022 06:35 )             39.5                         12.4   3.89  )-----------( 261      ( 19 Aug 2022 06:45 )             39.9                         12.8   4.79  )-----------( 260      ( 18 Aug 2022 08:20 )             40.6     20 Aug 2022 06:35    138    |  96     |  22     ----------------------------<  113    3.7     |  36     |  0.81   19 Aug 2022 06:45    139    |  95     |  20     ----------------------------<  102    4.0     |  36     |  0.84   18 Aug 2022 08:20    136    |  96     |  13     ----------------------------<  102    3.8     |  34     |  0.75     Ca    9.4        20 Aug 2022 06:35  Ca    9.4        19 Aug 2022 06:45  Ca    9.6        18 Aug 2022 08:20    TPro  7.8    /  Alb  3.2    /  TBili  0.4    /  DBili  x      /  AST  38     /  ALT  34     /  AlkPhos  80     20 Aug 2022 06:35  TPro  7.3    /  Alb  2.9    /  TBili  0.6    /  DBili  x      /  AST  23     /  ALT  22     /  AlkPhos  79     19 Aug 2022 06:45    TTE pending    ACC: [id number] EXAM:  XR CHEST PORTABLE URGENT 1V                          PROCEDURE DATE:  08/16/2022      INTERPRETATION:  Procedure: Chest x-ray    History: Shortness of breath    Comment:  Frontal view of the chest was obtained. The cardiac silhouette   is prominent.  The lungs are clear without infiltrate, effusion, or   pneumothorax. The osseous structures are unremarkable.      Impression:  1. Clear lungs    --- End of Report ---    MICHELL MCMULLEN MD; Attending Radiologist  This document has been electronically signed. Aug 18 2022  2:40PM    Ventricular Rate 82 BPM    QRS Duration 70 ms    Q-T Interval 290 ms    QTC Calculation(Bazett) 338 ms    R Axis -2 degrees    T Axis -11 degrees    Diagnosis Line Atrial fibrillation  Inferior infarct , age undetermined  Abnormal ECG  No previous ECGs available  Confirmed by alfredo Mejia ([id number]) on 8/17/2022 2:39:30 PM

## 2022-08-20 NOTE — PROGRESS NOTE ADULT - PROBLEM SELECTOR PLAN 1
- Pt with 1-2 weeks of orthopnea, and the last 3 days with DUNCAN, LE edema and cough 2/2 acute HF. Less likely respiratory source, denies upper respiratory symptoms, no fever.   - No h/o CHF, not on Lasix at home   - Mild volume overload on exam, s/p Lasix 20mg IVP in the ED   - pBNP 660.   - Negative RVP, normal WBC.   - Continue Lasix 20 mg IVP BID  -  FU TTE Official read.   - LE Doppler: Neg  - Monitor I&Os, renal function.   - Cardiology following. - Pt with 1-2 weeks of orthopnea, and the last 3 days with DUNCAN, LE edema and cough 2/2 acute HF. Less likely respiratory source, denies upper respiratory symptoms, no fever.   - No h/o CHF, not on Lasix at home   - Mild volume overload on exam, s/p Lasix 20mg IVP in the ED   - pBNP 660.   - Negative RVP, normal WBC.   - Continue Lasix 20 mg IVP BID  - fu TTE Official read   - LE Doppler: Neg  - Monitor I&Os, renal function.   - Cardiology following.

## 2022-08-20 NOTE — PROGRESS NOTE ADULT - ASSESSMENT
75 yo female with PMH of HTN, T2DM, obesity, presents to the ED for DUNCAN. Pt reports 1 to 2 weeks of orthopnea, associated over the last 2 days to DUNCAN, wet cough and LE edema.  Admitted for acute heart failure and found to have Afib with rate controlled.     ADHF  - Compensated from HF standpoint, tolerating RA.  Edema resolved  - Takes HCTZ 25 mg and Lasix 20 mg at home.    - Wouldincrease Lasix 40 mg to q12H upon D/C  - Continue to monitor strict I/o's and daily weights  - Follow up TTE report    - EKG with new onset Afib HR of 88 BPM and on tele monitor a-fib with rates of 90  - Now on Eliquis 5 bid  - Continue Toprol XL 50 mg daily    - BP is stable and controlled.      - Monitor and replete lyes Mg>2 and K>4   - Stable from cardiac standpoint, though, would prefer to wait for TTE report to better guide us with medication regimen.  - Will continue to follow while admitted    Shaista Lunsford DNP, NP-C  Cardiology   Spectra #[phone]          75 yo female with PMH of HTN, T2DM, obesity, presents to the ED for DUNCAN. Pt reports 1 to 2 weeks of orthopnea, associated over the last 2 days to DUNCAN, wet cough and LE edema.  Admitted for acute heart failure and found to have Afib with rate controlled.     ADHF  - Compensated from HF standpoint, tolerating RA.  Edema resolved  - Takes HCTZ 25 mg and Lasix 20 mg at home.    - Would increase Lasix 40 mg to q12H upon D/C  - Continue to monitor strict I/o's and daily weights  - Follow up TTE report    - EKG with new onset Afib HR of 88 BPM and on tele monitor a-fib with rates of 90  - Now on Eliquis 5 bid  - Continue Toprol XL 50 mg daily    - BP is stable and controlled.      - Monitor and replete lyes Mg>2 and K>4   - Stable from cardiac standpoint, though, would prefer to wait for TTE report to better guide us with medication regimen.  - Will continue to follow while admitted    Shaista Lunsford DNP, NP-C  Cardiology   Spectra #[phone]

## 2022-08-20 NOTE — PROGRESS NOTE ADULT - NS ATTEND AMEND GEN_ALL_CORE FT
af sufficiently rate controlled  cont ac  remains vol ol and would cont iv lasix until creat bumps
better compensated  echo with normal ef and no sig valve disease  cont lasix at dose higher than prior outpt dose
mild volume overload and adhf, in the setting of new af  cont diuretics  echocardiogram to evaluate LV function  af with fair rates, cont bb  cont ac with lovenox with plan to switch to oral ac .

## 2022-08-20 NOTE — PROGRESS NOTE ADULT - ATTENDING COMMENTS
The patient was seen and evaluated independently by the attending physician.  - I have personally reviewed the pt's labs, imaging, micro data and consultant recommendations.    #acute chf  #afib, rate controlled  #diabetes    - on room air  - change back to IV lasix due to persistent edema and reduced exercise tolerance  - spoke w/ cardio  - spoke w/ family over th ephone while at bedside, all in agreement  - suspect dc over the weekend The patient was seen and evaluated independently by the attending physician.  - I have personally reviewed the pt's labs, imaging, micro data and consultant recommendations.    #acute chf  #afib, rate controlled  #diabetes    - dc home today. tte read and reviewed.

## 2022-08-20 NOTE — PROGRESS NOTE ADULT - PROBLEM SELECTOR PLAN 5
- Eliquis 5mg BID
- Lovenox full Ac dose for Afib.

## 2022-08-20 NOTE — PROGRESS NOTE ADULT - PROBLEM SELECTOR PLAN 2
- No known h/o AFib.  Admits palpitations 2 nights ago that self resolved   - EKG with Afib rate controlled 82 bpm.   - EMMANUEL-DS-VAs score 5  - No contraindications were found for anticoagulation.   - s/p Lovenox 100mg SC x1  - Started on Eliquis 5mg BID  - Lopressor changed to Toprol 50mg   - Telemetry monitor  - Cards following.

## 2022-08-20 NOTE — PROGRESS NOTE ADULT - PROBLEM SELECTOR PLAN 4
- Unknown A1c, glucose 110 on admission.   - Pt reports finger stick at home 100-130's in fasting   - Hold home metformin  - Start lispro low dose slid correct. scale, hypoglycemia protocol  - Accuckecks and adjust insulin as needed  - Diabetic diet.
- Unknown A1c, glucose 110 on admission.   - Pt reports finger stick at home 100-130's in fasting   - Hold home metformin  - Start lispro low dose slid correct. scale, hypoglycemia protocol  - Accuchecks and adjust insulin as needed  - Diabetic diet.
- Unknown A1c, glucose 110 on admission.   - Pt reports finger stick at home 100-130's in fasting   - Hold home metformin  - Start lispro low dose slid correct. scale, hypoglycemia protocol  - Accuchecks and adjust insulin as needed  - Diabetic diet.
- Unknown A1c, glucose 110 on admission.   - Pt reports finger stick at home 100-130's in fasting   - Hold home metformin  - Start lispro low dose slid correct. scale, hypoglycemia protocol  - Accuckecks and adjust insulin as needed  - Diabetic diet.

## 2022-08-28 ENCOUNTER — FORM ENCOUNTER (OUTPATIENT)
Age: 77
End: 2022-08-28

## 2022-08-29 PROBLEM — I10 ESSENTIAL (PRIMARY) HYPERTENSION: Chronic | Status: ACTIVE | Noted: 2022-08-16

## 2022-08-29 PROBLEM — E11.9 TYPE 2 DIABETES MELLITUS WITHOUT COMPLICATIONS: Chronic | Status: ACTIVE | Noted: 2022-08-16

## 2022-08-30 ENCOUNTER — APPOINTMENT (OUTPATIENT)
Dept: CARE COORDINATION | Facility: HOME HEALTH | Age: 77
End: 2022-08-30

## 2022-08-30 VITALS
OXYGEN SATURATION: 96 % | DIASTOLIC BLOOD PRESSURE: 72 MMHG | HEART RATE: 62 BPM | RESPIRATION RATE: 17 BRPM | SYSTOLIC BLOOD PRESSURE: 118 MMHG

## 2022-08-30 PROCEDURE — 99349 HOME/RES VST EST MOD MDM 40: CPT

## 2022-08-30 NOTE — HISTORY OF PRESENT ILLNESS
[Admitted on: ___] : The patient was admitted on [unfilled] [Discharged on ___] : discharged on [unfilled] [FreeTextEntry2] : As per inpt record Pt is 77 yo female with PMH of HTN, T2DM, obesity, presents to the ED for DUNCAN. Pt reports 1 to 2 weeks of orthopnea, associated over the last 2 days to DUNCAN, wet cough and LE edema. \par Patient was admitted for acute CHF and afib rate controlled. Patient was seen by cardiology and was started on IV lasix 20mg BID and Lopressor 25mg BID. Lopressor was switched to Toprol 50mg daily on day of discharge. Patient was started on Lovenox 100mg for Afib and switched to Eliquis 5mg BID prior to dc. Patient's home medication HCTZ was held inpatient in setting of YOBANI. TTE was ordered and evaluated by Cardiology. Pt will go home on 40mg po lasix bid until following with cardiology. \par CHF/HTN/afib: denies CP, SOB, dizziness, wt today 199 which has been stable, patient compliant with regimen. Patient to see cardio Dr. Mejia next week as per pt and ADI\par DM: reports today , denies any complaints, compliant with regimen\par \par Patient and ADI reports that patient had not seen PCP in a while and is requesting new provider\par

## 2022-08-30 NOTE — PLAN
[FreeTextEntry1] : Case d/w HC RN\par Reminded of CN/NP role and yellow card, advised to call with any questions/concerns, verbalized understanding

## 2022-08-30 NOTE — PHYSICAL EXAM
[No Acute Distress] : no acute distress [Well Nourished] : well nourished [Well Developed] : well developed [No Respiratory Distress] : no respiratory distress  [Clear to Auscultation] : lungs were clear to auscultation bilaterally [No Accessory Muscle Use] : no accessory muscle use [Normal Rate] : normal rate  [Regular Rhythm] : with a regular rhythm [Pedal Pulses Present] : the pedal pulses are present [No Edema] : there was no peripheral edema [Soft] : abdomen soft [Non Tender] : non-tender [Non-distended] : non-distended [Normal Bowel Sounds] : normal bowel sounds [No Joint Swelling] : no joint swelling [No Rash] : no rash

## 2022-08-31 PROBLEM — Z86.79 HISTORY OF ATRIAL FIBRILLATION: Status: RESOLVED | Noted: 2022-08-31 | Resolved: 2022-08-31

## 2022-08-31 PROBLEM — Z78.9 DOES NOT USE ILLICIT DRUGS: Status: ACTIVE | Noted: 2022-08-31

## 2022-08-31 PROBLEM — Z86.79 HISTORY OF CONGESTIVE HEART FAILURE: Status: RESOLVED | Noted: 2022-08-31 | Resolved: 2022-08-31

## 2022-08-31 PROBLEM — Z78.9 DENIES ALCOHOL CONSUMPTION: Status: ACTIVE | Noted: 2022-08-31

## 2022-08-31 PROBLEM — Z86.79 HISTORY OF HYPERTENSION: Status: RESOLVED | Noted: 2022-08-31 | Resolved: 2022-08-31

## 2022-09-01 ENCOUNTER — APPOINTMENT (OUTPATIENT)
Dept: CARE COORDINATION | Facility: HOME HEALTH | Age: 77
End: 2022-09-01

## 2022-09-01 DIAGNOSIS — Z86.79 PERSONAL HISTORY OF OTHER DISEASES OF THE CIRCULATORY SYSTEM: ICD-10-CM

## 2022-09-01 DIAGNOSIS — Z78.9 OTHER SPECIFIED HEALTH STATUS: ICD-10-CM

## 2022-09-02 ENCOUNTER — APPOINTMENT (OUTPATIENT)
Dept: CARE COORDINATION | Facility: HOME HEALTH | Age: 77
End: 2022-09-02
Payer: MEDICARE

## 2022-09-02 PROCEDURE — 99348 HOME/RES VST EST LOW MDM 30: CPT | Mod: 95

## 2022-09-02 PROCEDURE — G0180 MD CERTIFICATION HHA PATIENT: CPT | Mod: 59

## 2022-09-02 NOTE — HISTORY OF PRESENT ILLNESS
[Other:____] : [unfilled] [Verbal consent obtained from patient] : the patient, [unfilled] [FreeTextEntry1] : Follow up post discharge\par  [de-identified] : This patient is Enrolled in the Bridge Follow Up Program through [employer] [facility] [employer]\par Copied As per [facility] Discharge Summary\par \par "76 year old female with PMH of HTN, T2DM, obesity, presents to the ED for DUNCAN. Pt reports 1 to 2 weeks of orthopnea, associated over the last 2 days to DUNCAN, wet cough and LE edema. Pt states normal breathing at rest/sitting position. Admits palpitations 2 nights ago that resolved, and non associated to other symptoms. At the moment of the evaluation pt reports improvement of LE edema \par after IV med. ' The patient was discharged home in stable condition with home care services and follow up care.\par \par \par During the TeleHealth the patient was in no acute distress.  Able to speak with complete sentences and no audible wheeze noted.\par The patient denies chest pain, shortness of breath, cough, hemoptysis, fever, palpitations, syncope\par \par \par CN reviewed with the pt that /she is under the [facility] program for home care until he is seen by his PCP.   CN will be assigned to sign Home Care orders post-discharge.\par \par Pt has PCP appointment scheduled\par

## 2022-09-02 NOTE — ASSESSMENT
[FreeTextEntry1] : "76 year old female with PMH of HTN, T2DM, obesity, presents to the ED for DUNCAN. Pt reports 1 to 2 weeks of orthopnea, associated over the last 2 days to DUNCAN, wet cough and LE edema. Pt states normal breathing at rest/sitting position. Admits palpitations 2 nights ago that resolved, and non associated to other symptoms. At the moment of the evaluation pt reports improvement of LE edema after IV med.   Pt was admitted for CHF and AFib' The patient was discharged home in stable condition with home care services and follow up care.\par \par \par Acute on chronic CHF: stable\par Continue established treatment plan with follow up\par Continue diuretic therapy lasix\par Maintain low salt diet, 1.5 fluid restriction\par Check daily weight and maintain log\par Reviewed when to call MD with worsening symptoms\par Pt verbalized good understanding\par Follow up with PCP and Cardiology\par \par AFib: stable\par Continue established treatment plan with follow up\par Continue Eliquis and Metoprolol\par Reviewed symptom monitoring with pt and when to call medical provider\par Bleeding precautions reviewed with the pt \par Pt verbalized good understanding\par Follow up with PCP and Cardiologist\par \par \par HTN: stable\par Continue established treatment plan with follow up\par Continue BP meds\par Monitor for worsening  signs and symptoms of and reviewed when to call medical provider\par Pt verbalized good understanding\par Follow up with PCP and Cardiologist\par \par DM: stable\par Continue established treatment plan with follow up\par Continue Metformin\par Monitor for signs and symptoms of hypo/hyperglycemia\par Monitor FS\par Pt verbalized good understanding\par Follow up with PCP, Endocrinology\par \par \par \par Pt will need continued Home Care Services RN\par This patient is Enrolled in the Bridge Follow Up Program through [employer]\par CN reviewed with the pt that she is under the [facility] [employer] [facility] program for home care until she is seen by her PCP.   CN will be assigned to sign Home Care orders post-discharge.\par \par \par \par

## 2022-09-02 NOTE — PHYSICAL EXAM
[No Acute Distress] : no acute distress [Well Nourished] : well nourished [Normal Sclera/Conjunctiva] : normal sclera/conjunctiva [Normal Outer Ear/Nose] : the outer ears and nose were normal in appearance [No JVD] : no jugular venous distention [No Respiratory Distress] : no respiratory distress  [No Accessory Muscle Use] : no accessory muscle use [Non Tender] : non-tender [de-identified] : Limited Visual Physical Exam during TeleHealth Visit [de-identified] : Awake and alert [de-identified] : Calm

## 2022-09-20 ENCOUNTER — NON-APPOINTMENT (OUTPATIENT)
Age: 77
End: 2022-09-20

## 2022-09-20 ENCOUNTER — APPOINTMENT (OUTPATIENT)
Dept: CARDIOLOGY | Facility: CLINIC | Age: 77
End: 2022-09-20

## 2022-09-20 VITALS
DIASTOLIC BLOOD PRESSURE: 81 MMHG | SYSTOLIC BLOOD PRESSURE: 118 MMHG | BODY MASS INDEX: 34.49 KG/M2 | HEIGHT: 64 IN | WEIGHT: 202 LBS | HEART RATE: 64 BPM | OXYGEN SATURATION: 95 %

## 2022-09-20 PROCEDURE — 93000 ELECTROCARDIOGRAM COMPLETE: CPT

## 2022-09-20 PROCEDURE — 99215 OFFICE O/P EST HI 40 MIN: CPT

## 2022-09-20 RX ORDER — METOPROLOL TARTRATE 25 MG/1
25 TABLET, FILM COATED ORAL TWICE DAILY
Qty: 180 | Refills: 0 | Status: DISCONTINUED | COMMUNITY
Start: 2022-08-30 | End: 2022-09-20

## 2022-09-20 NOTE — HISTORY OF PRESENT ILLNESS
[FreeTextEntry1] : This is a 76 year old female with a history of AF, diabetes, hypertension, and HFpEF wh presents to the office for follow up.  In August of 2022 she was admitted to  with new onset AF and decompensated diastolic CHF.  She was rate controlled diuresed, and discharged.  She had an echocardiogram that showed normal LV function with no significant valvular heart disease.  She reports that her pressure has been getting low at home, and she has reduced metoprolol to 12.5 bid. She is still taking BID Lasix.  Her degree of DUNCAN is back to her previous baseline.  She has been bruising easily.  SHe denies PND, orthopnea, dizziness, or syncope  Her degree of LE edema at the end of the day is back to her previous baseline.

## 2022-09-20 NOTE — PHYSICAL EXAM
[Well Developed] : well developed [Well Nourished] : well nourished [No Acute Distress] : no acute distress [Normal Conjunctiva] : normal conjunctiva [Normal Venous Pressure] : normal venous pressure [No Carotid Bruit] : no carotid bruit [Normal S1, S2] : normal S1, S2 [No Rub] : no rub [No Gallop] : no gallop [Normal Rate] : normal [Irregularly Irregular] : irregularly irregular [II] : a grade 2 [___ +] : bilateral [unfilled]U+ pitting edema to the ankles [Clear Lung Fields] : clear lung fields [Good Air Entry] : good air entry [No Respiratory Distress] : no respiratory distress  [Soft] : abdomen soft [Non Tender] : non-tender [No Masses/organomegaly] : no masses/organomegaly [Normal Bowel Sounds] : normal bowel sounds [Normal Gait] : normal gait [No Edema] : no edema [No Cyanosis] : no cyanosis [No Clubbing] : no clubbing [No Varicosities] : no varicosities [No Rash] : no rash [No Skin Lesions] : no skin lesions [Moves all extremities] : moves all extremities [No Focal Deficits] : no focal deficits [Normal Speech] : normal speech [Alert and Oriented] : alert and oriented [Normal memory] : normal memory

## 2022-09-20 NOTE — DISCUSSION/SUMMARY
[FreeTextEntry1] : This is a 76 year old female with a history of AF, diabetes, hypertension, and HFpEF wh presents to the office for follow up.  In August of 2022 she was admitted to  with new onset AF and decompensated diastolic CHF.  She was rate controlled diuresed, and discharged.  She had an echocardiogram that showed normal LV function with no significant valvular heart disease.  She reports that her pressure has been getting low at home, and she has reduced metoprolol to 12.5 bid. She is still taking BID Lasix.  I am changing her to Toprol XL 25 QD.  I am reducing Lasix to QD dosing.  She will continue Eliquis for stroke risk reduction.  SHe is being referred for a nuclear stress test to assess for ischemic heart disease.  I will see her back in one month.

## 2022-09-30 ENCOUNTER — NON-APPOINTMENT (OUTPATIENT)
Age: 77
End: 2022-09-30

## 2022-10-03 ENCOUNTER — APPOINTMENT (OUTPATIENT)
Dept: CARDIOLOGY | Facility: CLINIC | Age: 77
End: 2022-10-03

## 2022-10-03 PROCEDURE — 78452 HT MUSCLE IMAGE SPECT MULT: CPT

## 2022-10-03 PROCEDURE — A9500: CPT

## 2022-10-03 PROCEDURE — 93015 CV STRESS TEST SUPVJ I&R: CPT

## 2022-10-14 ENCOUNTER — NON-APPOINTMENT (OUTPATIENT)
Age: 77
End: 2022-10-14

## 2022-10-14 ENCOUNTER — APPOINTMENT (OUTPATIENT)
Dept: CARDIOLOGY | Facility: CLINIC | Age: 77
End: 2022-10-14

## 2022-10-14 VITALS
BODY MASS INDEX: 34.49 KG/M2 | HEIGHT: 64 IN | OXYGEN SATURATION: 95 % | HEART RATE: 72 BPM | WEIGHT: 202 LBS | DIASTOLIC BLOOD PRESSURE: 83 MMHG | SYSTOLIC BLOOD PRESSURE: 132 MMHG

## 2022-10-14 VITALS — SYSTOLIC BLOOD PRESSURE: 116 MMHG | DIASTOLIC BLOOD PRESSURE: 76 MMHG

## 2022-10-14 DIAGNOSIS — E87.70 FLUID OVERLOAD, UNSPECIFIED: ICD-10-CM

## 2022-10-14 PROCEDURE — 99215 OFFICE O/P EST HI 40 MIN: CPT

## 2022-10-14 PROCEDURE — 93000 ELECTROCARDIOGRAM COMPLETE: CPT

## 2022-10-14 NOTE — HISTORY OF PRESENT ILLNESS
[FreeTextEntry1] : This is a 77 year old female with a history of AF, diabetes, hypertension, and HFpEF wh presents to the office for follow up.  In August of 2022 she was admitted to  with new onset AF and decompensated diastolic CHF.  She was rate controlled diuresed, and discharged.  She had an echocardiogram that showed normal LV function with no significant valvular heart disease.  She reports that her pressure has been getting low at home, and she has reduced metoprolol to 12.5 bid. She is still taking BID Lasix.  Her degree of DUNCAN is back to her previous baseline.  She has been bruising easily.  SHe denies PND, orthopnea, dizziness, or syncope  Her degree of LE edema at the end of the day is back to her previous baseline.  \par \par \par \par Sammie presents back today October 14, 2022 for 1 month follow-up.\par She arrives with her son whom states she has been doing well.  She is less short of breath.  She was able to walk from the parking lot to the office building without distress.  She does still however sleep with 2-3 pillows, he states she is uncomfortable while laying flat.\par Her blood pressure at home has been controlled as well as her sugars.\par She has been tolerating adjustments to her medication regimen that was made last visit due to low blood pressure.\par She has been compliant with her medications.

## 2022-10-14 NOTE — CARDIOLOGY SUMMARY
[de-identified] : Rate controlled atrial fibrillation.  [de-identified] : pharm\par 10/2022\par - infarct \par -ischemia [de-identified] : 8/18/22\par Technically difficult study\par Normal left ventricular internal dimensions and systolic function, \par estimated LVEF of 65%.\par The right ventricle is not well-visualized\par Sclerotic trileaflet aortic valve with grossly normal opening, mild AI.\par Trace physiologic MR and TR.\par No significant pericardial effusion.\par

## 2022-10-14 NOTE — DISCUSSION/SUMMARY
[FreeTextEntry1] : This is a 77 year old female with a history of AF, diabetes, hypertension, and HFpEF wh presents to the office for follow up.  In August of 2022 she was admitted to  with new onset AF and decompensated diastolic CHF.  She was rate controlled diuresed, and discharged.  She had an echocardiogram that showed normal LV functio and mild AI.  Nuclear stress test done performed this month that was un remarkable.\par She is in no acute distress.  She does still present with evidence of mild fluid volume excess, mild orthopnea(2 -3pillows) and diminished breath sounds on exam.  I have asked she take lasix 60mg alternating with 40mg with potassium supplement. Because she had tendency toward lower blood pressure, I have asked that they continue to monitor her b/p at home. Avoid sudden postural changes.\par I will send her for blood work today to monitor kidney function and electrolyte.  I have also encouraged daily weights.\par \par ECG illustrates atrial fibrillation, rate controlled. She will continue Toprol XL 25 QD. She will continue Eliquis for stroke risk reduction. \par \par I will call her with lab results.\par She will followup again in 1 month.

## 2022-10-18 ENCOUNTER — NON-APPOINTMENT (OUTPATIENT)
Age: 77
End: 2022-10-18

## 2022-10-18 LAB
ALBUMIN SERPL ELPH-MCNC: 4.1 G/DL
ALP BLD-CCNC: 76 U/L
ALT SERPL-CCNC: 18 U/L
ANION GAP SERPL CALC-SCNC: 11 MMOL/L
AST SERPL-CCNC: 18 U/L
BILIRUB SERPL-MCNC: 0.5 MG/DL
BUN SERPL-MCNC: 17 MG/DL
CALCIUM SERPL-MCNC: 9.8 MG/DL
CHLORIDE SERPL-SCNC: 102 MMOL/L
CHOLEST SERPL-MCNC: 148 MG/DL
CO2 SERPL-SCNC: 29 MMOL/L
CREAT SERPL-MCNC: 0.88 MG/DL
EGFR: 68 ML/MIN/1.73M2
GLUCOSE SERPL-MCNC: 103 MG/DL
HDLC SERPL-MCNC: 59 MG/DL
LDLC SERPL CALC-MCNC: 74 MG/DL
NONHDLC SERPL-MCNC: 89 MG/DL
NT-PROBNP SERPL-MCNC: 558 PG/ML
POTASSIUM SERPL-SCNC: 5 MMOL/L
PROT SERPL-MCNC: 6.9 G/DL
SODIUM SERPL-SCNC: 142 MMOL/L
TRIGL SERPL-MCNC: 77 MG/DL

## 2022-10-20 ENCOUNTER — NON-APPOINTMENT (OUTPATIENT)
Age: 77
End: 2022-10-20

## 2022-10-20 ENCOUNTER — APPOINTMENT (OUTPATIENT)
Dept: INTERNAL MEDICINE | Facility: CLINIC | Age: 77
End: 2022-10-20

## 2022-10-20 VITALS
DIASTOLIC BLOOD PRESSURE: 86 MMHG | OXYGEN SATURATION: 100 % | WEIGHT: 200 LBS | HEART RATE: 79 BPM | SYSTOLIC BLOOD PRESSURE: 127 MMHG | BODY MASS INDEX: 34.33 KG/M2

## 2022-10-20 DIAGNOSIS — R71.8 OTHER ABNORMALITY OF RED BLOOD CELLS: ICD-10-CM

## 2022-10-20 DIAGNOSIS — Z23 ENCOUNTER FOR IMMUNIZATION: ICD-10-CM

## 2022-10-20 PROCEDURE — 36415 COLL VENOUS BLD VENIPUNCTURE: CPT

## 2022-10-20 PROCEDURE — 90662 IIV NO PRSV INCREASED AG IM: CPT

## 2022-10-20 PROCEDURE — 99214 OFFICE O/P EST MOD 30 MIN: CPT | Mod: 25

## 2022-10-20 PROCEDURE — G0008: CPT

## 2022-10-20 NOTE — REVIEW OF SYSTEMS
[Fever] : no fever [Chills] : no chills [Night Sweats] : no night sweats [Chest Pain] : no chest pain [Palpitations] : no palpitations [Shortness Of Breath] : no shortness of breath [Wheezing] : no wheezing [Cough] : no cough [Abdominal Pain] : no abdominal pain [Nausea] : no nausea [Constipation] : no constipation [Diarrhea] : diarrhea [Vomiting] : no vomiting [Headache] : no headache [Dizziness] : no dizziness

## 2022-10-20 NOTE — HISTORY OF PRESENT ILLNESS
[FreeTextEntry8] : Sammie Isaacs today as a new patient.  She has a history of type 2 diabetes, hypertension, and new onset A. fib with secondary diastolic heart failure.  She was diuresed and started on Eliquis for anticoagulation.  She has seen cardiology since discharge.  She feels well overall.  She has no chest pain, palpitations, shortness of breath, or dizziness.  She does have some orthopnea and uses 2-3 pillows at baseline.  This is unchanged from prior.\par \par Labs from the hospital were notable for microcytosis.  No personal or family history of iron deficiency or anemia

## 2022-10-20 NOTE — PHYSICAL EXAM
[No Acute Distress] : no acute distress [Well Nourished] : well nourished [Well Developed] : well developed [Well-Appearing] : well-appearing [No JVD] : no jugular venous distention [Thyroid Normal, No Nodules] : the thyroid was normal and there were no nodules present [No Respiratory Distress] : no respiratory distress  [No Accessory Muscle Use] : no accessory muscle use [Clear to Auscultation] : lungs were clear to auscultation bilaterally [Normal Rate] : normal rate  [Normal S1, S2] : normal S1 and S2 [No Murmur] : no murmur heard [No Carotid Bruits] : no carotid bruits [No Abdominal Bruit] : a ~M bruit was not heard ~T in the abdomen [No Edema] : there was no peripheral edema [Soft] : abdomen soft [Non Tender] : non-tender [Non-distended] : non-distended [Normal Bowel Sounds] : normal bowel sounds [de-identified] : Irregularly irregular rhythm

## 2022-10-26 LAB
BASOPHILS # BLD AUTO: 0.03 K/UL
BASOPHILS NFR BLD AUTO: 0.7 %
EOSINOPHIL # BLD AUTO: 0.2 K/UL
EOSINOPHIL NFR BLD AUTO: 4.6 %
FERRITIN SERPL-MCNC: 30 NG/ML
HCT VFR BLD CALC: 38.9 %
HGB A MFR BLD: 97.9 %
HGB A2 MFR BLD: 2.1 %
HGB BLD-MCNC: 11.9 G/DL
HGB FRACT BLD-IMP: NORMAL
IMM GRANULOCYTES NFR BLD AUTO: 0.2 %
IRON SATN MFR SERPL: 17 %
IRON SERPL-MCNC: 67 UG/DL
LYMPHOCYTES # BLD AUTO: 1.46 K/UL
LYMPHOCYTES NFR BLD AUTO: 33.6 %
MAN DIFF?: NORMAL
MCHC RBC-ENTMCNC: 25 PG
MCHC RBC-ENTMCNC: 30.6 GM/DL
MCV RBC AUTO: 81.7 FL
MONOCYTES # BLD AUTO: 0.47 K/UL
MONOCYTES NFR BLD AUTO: 10.8 %
NEUTROPHILS # BLD AUTO: 2.17 K/UL
NEUTROPHILS NFR BLD AUTO: 50.1 %
PLATELET # BLD AUTO: 193 K/UL
RBC # BLD: 4.76 M/UL
RBC # FLD: 16.7 %
T4 FREE SERPL-MCNC: 1.2 NG/DL
TIBC SERPL-MCNC: 385 UG/DL
TSH SERPL-ACNC: 2.54 UIU/ML
UIBC SERPL-MCNC: 318 UG/DL
WBC # FLD AUTO: 4.34 K/UL

## 2022-11-01 ENCOUNTER — RESULT CHARGE (OUTPATIENT)
Age: 77
End: 2022-11-01

## 2022-11-02 ENCOUNTER — APPOINTMENT (OUTPATIENT)
Dept: CARDIOLOGY | Facility: CLINIC | Age: 77
End: 2022-11-02

## 2022-11-02 ENCOUNTER — NON-APPOINTMENT (OUTPATIENT)
Age: 77
End: 2022-11-02

## 2022-11-02 VITALS — DIASTOLIC BLOOD PRESSURE: 70 MMHG | SYSTOLIC BLOOD PRESSURE: 102 MMHG

## 2022-11-02 VITALS
DIASTOLIC BLOOD PRESSURE: 76 MMHG | SYSTOLIC BLOOD PRESSURE: 108 MMHG | BODY MASS INDEX: 33.63 KG/M2 | OXYGEN SATURATION: 98 % | WEIGHT: 197 LBS | HEIGHT: 64 IN | HEART RATE: 80 BPM

## 2022-11-02 PROCEDURE — 93000 ELECTROCARDIOGRAM COMPLETE: CPT

## 2022-11-02 PROCEDURE — 99214 OFFICE O/P EST MOD 30 MIN: CPT

## 2022-11-02 NOTE — DISCUSSION/SUMMARY
[FreeTextEntry1] : This is a 77 year old female with a history of AF, diabetes, hypertension, and HFpEF wh presents to the office for follow up.  In August of 2022 she was admitted to  with new onset AF and decompensated diastolic CHF.  She was rate controlled diuresed, and discharged.  She had an echocardiogram that showed normal LV functio and mild AI.  Nuclear stress test done performed this month that was un remarkable.\par She is in no acute distress.  She is clearly more euvolemic on this exam.  She is down 5 pounds since last visit.  ECG illustrates atrial fibrillation, rate controlled.  Her blood pressure is on the lower side of normal. \par She will now reduce Lasix to 40 mg once a day, with potassium supplement 20 mEq every other day.  Her latest CMP demonstrated normal creatinine and a potassium of 5.\par  Because she had tendency toward lower blood pressure, they will continue to monitor her blood pressure, and avoid sudden postural changes.\par She will continue Toprol XL 25 QD for rate control. She will continue Eliquis for stroke risk reduction. \par \par She will continue lifestyle modifications such as increasing exercise and monitoring her diet.\par She will followup again in 3 month.

## 2022-11-02 NOTE — HISTORY OF PRESENT ILLNESS
[FreeTextEntry1] : This is a 77 year old female with a history of AF, diabetes, hypertension, and HFpEF wh presents to the office for follow up.  In August of 2022 she was admitted to  with new onset AF and decompensated diastolic CHF.  She was rate controlled diuresed, and discharged.  She had an echocardiogram that showed normal LV function with no significant valvular heart disease.\par \par \par \par Ms Palmer arrives today with her son for follow-up.  She was seen in the office 3 weeks ago.  At that visit she presented with signs of mild fluid volume overload.  Lasix was increased to 60 mg alternating with 40 mg.  She presents back today feeling well, much improved.  She is able to lay with 1 pillow more comfortable now.  She is less short of breath with ambulation.\par Her son states she has a "cold" now.  The children in the house were recently sick with a virus and now she is coughing and congested.  Her cough is productive.  They deny fever.\par \par She has been compliant with her medications.  She denies bleeding issues on Eliquis.\par \par She denies chest pains or pressure. She  denies dizzy spells, feeling faint or fainting, She   denies palpitations or difficulty breathing while laying flat. She denies lower extremity swelling.\par \par

## 2022-11-02 NOTE — CARDIOLOGY SUMMARY
[de-identified] : Rate controlled atrial fibrillation.  [de-identified] : pharm\par 10/2022\par - infarct \par -ischemia [de-identified] : 8/18/22\par Technically difficult study\par Normal left ventricular internal dimensions and systolic function, \par estimated LVEF of 65%.\par The right ventricle is not well-visualized\par Sclerotic trileaflet aortic valve with grossly normal opening, mild AI.\par Trace physiologic MR and TR.\par No significant pericardial effusion.\par

## 2022-12-20 ENCOUNTER — NON-APPOINTMENT (OUTPATIENT)
Age: 77
End: 2022-12-20

## 2022-12-20 ENCOUNTER — TRANSCRIPTION ENCOUNTER (OUTPATIENT)
Age: 77
End: 2022-12-20

## 2022-12-20 ENCOUNTER — APPOINTMENT (OUTPATIENT)
Dept: INTERNAL MEDICINE | Facility: CLINIC | Age: 77
End: 2022-12-20

## 2022-12-20 VITALS
SYSTOLIC BLOOD PRESSURE: 127 MMHG | BODY MASS INDEX: 34.15 KG/M2 | HEART RATE: 72 BPM | WEIGHT: 200 LBS | HEIGHT: 64 IN | DIASTOLIC BLOOD PRESSURE: 82 MMHG | OXYGEN SATURATION: 95 %

## 2022-12-20 DIAGNOSIS — R05.9 COUGH, UNSPECIFIED: ICD-10-CM

## 2022-12-20 DIAGNOSIS — Z00.00 ENCOUNTER FOR GENERAL ADULT MEDICAL EXAMINATION W/OUT ABNORMAL FINDINGS: ICD-10-CM

## 2022-12-20 PROCEDURE — 99213 OFFICE O/P EST LOW 20 MIN: CPT | Mod: 25

## 2022-12-20 PROCEDURE — 36415 COLL VENOUS BLD VENIPUNCTURE: CPT

## 2022-12-20 PROCEDURE — G0439: CPT

## 2022-12-20 PROCEDURE — G0444 DEPRESSION SCREEN ANNUAL: CPT | Mod: 59

## 2022-12-20 PROCEDURE — 93000 ELECTROCARDIOGRAM COMPLETE: CPT | Mod: 59

## 2022-12-20 NOTE — PHYSICAL EXAM
[No Acute Distress] : no acute distress [Well Nourished] : well nourished [Well Developed] : well developed [Normal Sclera/Conjunctiva] : normal sclera/conjunctiva [Normal Outer Ear/Nose] : the outer ears and nose were normal in appearance [Normal Oropharynx] : the oropharynx was normal [Normal TMs] : both tympanic membranes were normal [Normal Nasal Mucosa] : the nasal mucosa was normal [No Lymphadenopathy] : no lymphadenopathy [Thyroid Normal, No Nodules] : the thyroid was normal and there were no nodules present [No Respiratory Distress] : no respiratory distress  [No Accessory Muscle Use] : no accessory muscle use [Clear to Auscultation] : lungs were clear to auscultation bilaterally [Normal Rate] : normal rate  [Normal S1, S2] : normal S1 and S2 [No Murmur] : no murmur heard [No Carotid Bruits] : no carotid bruits [No Abdominal Bruit] : a ~M bruit was not heard ~T in the abdomen [No Edema] : there was no peripheral edema [Normal Appearance] : normal in appearance [No Masses] : no palpable masses [No Axillary Lymphadenopathy] : no axillary lymphadenopathy [Soft] : abdomen soft [Non Tender] : non-tender [Non-distended] : non-distended [Normal Bowel Sounds] : normal bowel sounds [Normal Supraclavicular Nodes] : no supraclavicular lymphadenopathy [Normal Axillary Nodes] : no axillary lymphadenopathy [Normal Posterior Cervical Nodes] : no posterior cervical lymphadenopathy [Normal Anterior Cervical Nodes] : no anterior cervical lymphadenopathy [No Spinal Tenderness] : no spinal tenderness [Normal Gait] : normal gait [] : both feet [de-identified] : Irregularly irregular rhythm

## 2022-12-20 NOTE — ASSESSMENT
[FreeTextEntry1] : 1.  CPE\par CBC, CMP, A1c, lipid, UA, stool FIT\par EKG is AF\par Rx for mammo and DEXA\par Advise establishing care with GYN, GI for next colonoscopy\par Already received flu vaccine.  Reports receiving pneumococcal vaccine in the past.\par \par 2.  Type 2 diabetes\par A1c today\par Urine albumin/creatinine ratio\par \par 3.  A. fib\par Rate controlled on beta-blocker\par Eliquis for anticoagulation\par \par 4.  Cough\par Suspect viral syndrome.  She is euvolemic on exam.  She is afebrile with clear lungs and unrevealing HEENT exam\par COVID-19 PCR\par Tessalon Perles\par To call/return if worsening or not improving\par \par Follow-up in 3 months

## 2022-12-20 NOTE — REVIEW OF SYSTEMS
[Fever] : no fever [Chills] : no chills [Fatigue] : no fatigue [Night Sweats] : no night sweats [Vision Problems] : no vision problems [Nasal Discharge] : no nasal discharge [Sore Throat] : no sore throat [Chest Pain] : no chest pain [Palpitations] : no palpitations [Shortness Of Breath] : no shortness of breath [Wheezing] : no wheezing [Cough] : no cough [Abdominal Pain] : no abdominal pain [Nausea] : no nausea [Constipation] : no constipation [Diarrhea] : diarrhea [Vomiting] : no vomiting [Dysuria] : no dysuria [Incontinence] : no incontinence [Hematuria] : no hematuria [Frequency] : no frequency [Headache] : no headache [Dizziness] : no dizziness [Anxiety] : no anxiety [Depression] : no depression

## 2022-12-20 NOTE — HEALTH RISK ASSESSMENT
[Never] : Never [0] : 2) Feeling down, depressed, or hopeless: Not at all (0) [PHQ-2 Negative - No further assessment needed] : PHQ-2 Negative - No further assessment needed [[id number]] : 0 [MammogramComments] : Rx provided [PapSmearComments] : Advised to follow-up with GYN [BoneDensityComments] : Rx provided [ColonoscopyComments] : Reports no history of polyps.  Reports her last colonoscopy was 4 years ago.  Recommend follow-up with GI in the next 1 year to determine timing of next scope.

## 2022-12-20 NOTE — HISTORY OF PRESENT ILLNESS
[Family Member] : family member [FreeTextEntry1] : CPE, T2DM, AF [de-identified] : Sammie presents for CPE.  Has been feeling well from a cardiovascular standpoint with stable weights and stable dyspnea.  Her Lasix was reduced to 40 mg daily with alternating every other day potassium supplementation.  She has had a cough over the last 2 to 3 days.  Cough is mostly nonproductive though she occasionally brings up some thin yellow sputum.  Her grandchildren have been sick at school.  No fevers or chills.  No runny nose or sore throat.  No nausea, vomiting, or diarrhea.  Has kept her up at night

## 2023-01-13 LAB
ALBUMIN SERPL ELPH-MCNC: 4 G/DL
ALP BLD-CCNC: 77 U/L
ALT SERPL-CCNC: 12 U/L
ANION GAP SERPL CALC-SCNC: 8 MMOL/L
APPEARANCE: CLEAR
AST SERPL-CCNC: 16 U/L
BACTERIA: NEGATIVE
BASOPHILS # BLD AUTO: 0.04 K/UL
BASOPHILS NFR BLD AUTO: 1 %
BILIRUB SERPL-MCNC: 0.5 MG/DL
BILIRUBIN URINE: NEGATIVE
BLOOD URINE: NEGATIVE
BUN SERPL-MCNC: 18 MG/DL
CALCIUM SERPL-MCNC: 9.5 MG/DL
CHLORIDE SERPL-SCNC: 105 MMOL/L
CHOLEST SERPL-MCNC: 143 MG/DL
CO2 SERPL-SCNC: 30 MMOL/L
COLOR: YELLOW
CREAT SERPL-MCNC: 0.86 MG/DL
CREAT SPEC-SCNC: 181 MG/DL
EGFR: 70 ML/MIN/1.73M2
EOSINOPHIL # BLD AUTO: 0.22 K/UL
EOSINOPHIL NFR BLD AUTO: 5.4 %
ESTIMATED AVERAGE GLUCOSE: 131 MG/DL
GLUCOSE QUALITATIVE U: NEGATIVE
GLUCOSE SERPL-MCNC: 101 MG/DL
HBA1C MFR BLD HPLC: 6.2 %
HCT VFR BLD CALC: 37.2 %
HDLC SERPL-MCNC: 56 MG/DL
HGB BLD-MCNC: 11.2 G/DL
HYALINE CASTS: 0 /LPF
IMM GRANULOCYTES NFR BLD AUTO: 0 %
KETONES URINE: NEGATIVE
LDLC SERPL CALC-MCNC: 74 MG/DL
LEUKOCYTE ESTERASE URINE: NEGATIVE
LYMPHOCYTES # BLD AUTO: 1.44 K/UL
LYMPHOCYTES NFR BLD AUTO: 35.5 %
MAN DIFF?: NORMAL
MCHC RBC-ENTMCNC: 25.3 PG
MCHC RBC-ENTMCNC: 30.1 GM/DL
MCV RBC AUTO: 84 FL
MICROALBUMIN 24H UR DL<=1MG/L-MCNC: 1.5 MG/DL
MICROALBUMIN/CREAT 24H UR-RTO: 8 MG/G
MICROSCOPIC-UA: NORMAL
MONOCYTES # BLD AUTO: 0.46 K/UL
MONOCYTES NFR BLD AUTO: 11.3 %
NEUTROPHILS # BLD AUTO: 1.9 K/UL
NEUTROPHILS NFR BLD AUTO: 46.8 %
NITRITE URINE: NEGATIVE
NONHDLC SERPL-MCNC: 87 MG/DL
PH URINE: 6
PLATELET # BLD AUTO: 189 K/UL
POTASSIUM SERPL-SCNC: 4.3 MMOL/L
PROT SERPL-MCNC: 6.6 G/DL
PROTEIN URINE: NORMAL
RBC # BLD: 4.43 M/UL
RBC # FLD: 16.9 %
RED BLOOD CELLS URINE: 4 /HPF
SARS-COV-2 N GENE NPH QL NAA+PROBE: NOT DETECTED
SODIUM SERPL-SCNC: 143 MMOL/L
SPECIFIC GRAVITY URINE: >=1.03
SQUAMOUS EPITHELIAL CELLS: 2 /HPF
TRIGL SERPL-MCNC: 63 MG/DL
UROBILINOGEN URINE: NORMAL
WBC # FLD AUTO: 4.06 K/UL
WHITE BLOOD CELLS URINE: 3 /HPF

## 2023-02-07 ENCOUNTER — NON-APPOINTMENT (OUTPATIENT)
Age: 78
End: 2023-02-07

## 2023-02-07 ENCOUNTER — APPOINTMENT (OUTPATIENT)
Dept: CARDIOLOGY | Facility: CLINIC | Age: 78
End: 2023-02-07
Payer: MEDICARE

## 2023-02-07 VITALS
OXYGEN SATURATION: 96 % | HEART RATE: 77 BPM | DIASTOLIC BLOOD PRESSURE: 88 MMHG | BODY MASS INDEX: 34.31 KG/M2 | SYSTOLIC BLOOD PRESSURE: 132 MMHG | HEIGHT: 64 IN | WEIGHT: 201 LBS

## 2023-02-07 PROCEDURE — 93000 ELECTROCARDIOGRAM COMPLETE: CPT

## 2023-02-07 PROCEDURE — 99214 OFFICE O/P EST MOD 30 MIN: CPT

## 2023-02-08 ENCOUNTER — APPOINTMENT (OUTPATIENT)
Dept: CARDIOLOGY | Facility: CLINIC | Age: 78
End: 2023-02-08
Payer: MEDICARE

## 2023-02-08 PROCEDURE — 93306 TTE W/DOPPLER COMPLETE: CPT

## 2023-02-08 RX ORDER — PERFLUTREN 6.52 MG/ML
6.52 INJECTION, SUSPENSION INTRAVENOUS
Qty: 1 | Refills: 0 | Status: COMPLETED | OUTPATIENT
Start: 2023-02-08

## 2023-04-25 ENCOUNTER — RX RENEWAL (OUTPATIENT)
Age: 78
End: 2023-04-25

## 2023-06-07 ENCOUNTER — APPOINTMENT (OUTPATIENT)
Dept: CARDIOLOGY | Facility: CLINIC | Age: 78
End: 2023-06-07
Payer: MEDICARE

## 2023-06-07 ENCOUNTER — NON-APPOINTMENT (OUTPATIENT)
Age: 78
End: 2023-06-07

## 2023-06-07 VITALS
OXYGEN SATURATION: 96 % | HEIGHT: 64 IN | BODY MASS INDEX: 33.46 KG/M2 | SYSTOLIC BLOOD PRESSURE: 133 MMHG | WEIGHT: 196 LBS | DIASTOLIC BLOOD PRESSURE: 79 MMHG | HEART RATE: 78 BPM

## 2023-06-07 VITALS — WEIGHT: 293 LBS | BODY MASS INDEX: 50.02 KG/M2 | HEIGHT: 64 IN

## 2023-06-07 PROCEDURE — 99214 OFFICE O/P EST MOD 30 MIN: CPT

## 2023-06-07 PROCEDURE — 93000 ELECTROCARDIOGRAM COMPLETE: CPT

## 2023-09-18 ENCOUNTER — RX RENEWAL (OUTPATIENT)
Age: 78
End: 2023-09-18

## 2023-09-21 ENCOUNTER — RX RENEWAL (OUTPATIENT)
Age: 78
End: 2023-09-21

## 2023-10-04 ENCOUNTER — APPOINTMENT (OUTPATIENT)
Dept: CARDIOLOGY | Facility: CLINIC | Age: 78
End: 2023-10-04
Payer: MEDICARE

## 2023-10-04 ENCOUNTER — NON-APPOINTMENT (OUTPATIENT)
Age: 78
End: 2023-10-04

## 2023-10-04 VITALS
WEIGHT: 198 LBS | DIASTOLIC BLOOD PRESSURE: 75 MMHG | OXYGEN SATURATION: 97 % | HEIGHT: 64 IN | SYSTOLIC BLOOD PRESSURE: 127 MMHG | BODY MASS INDEX: 33.8 KG/M2 | HEART RATE: 68 BPM

## 2023-10-04 PROCEDURE — 99214 OFFICE O/P EST MOD 30 MIN: CPT

## 2023-10-04 PROCEDURE — 93000 ELECTROCARDIOGRAM COMPLETE: CPT

## 2023-10-20 ENCOUNTER — APPOINTMENT (OUTPATIENT)
Dept: INTERNAL MEDICINE | Facility: CLINIC | Age: 78
End: 2023-10-20
Payer: MEDICARE

## 2023-10-20 VITALS
RESPIRATION RATE: 12 BRPM | BODY MASS INDEX: 34.15 KG/M2 | HEART RATE: 71 BPM | WEIGHT: 200 LBS | DIASTOLIC BLOOD PRESSURE: 80 MMHG | OXYGEN SATURATION: 96 % | SYSTOLIC BLOOD PRESSURE: 132 MMHG | HEIGHT: 64 IN

## 2023-10-20 DIAGNOSIS — I50.9 HEART FAILURE, UNSPECIFIED: ICD-10-CM

## 2023-10-20 DIAGNOSIS — E11.9 TYPE 2 DIABETES MELLITUS W/OUT COMPLICATIONS: ICD-10-CM

## 2023-10-20 DIAGNOSIS — I10 ESSENTIAL (PRIMARY) HYPERTENSION: ICD-10-CM

## 2023-10-20 DIAGNOSIS — D64.9 ANEMIA, UNSPECIFIED: ICD-10-CM

## 2023-10-20 PROCEDURE — 90662 IIV NO PRSV INCREASED AG IM: CPT

## 2023-10-20 PROCEDURE — 36415 COLL VENOUS BLD VENIPUNCTURE: CPT

## 2023-10-20 PROCEDURE — 99214 OFFICE O/P EST MOD 30 MIN: CPT | Mod: 25

## 2023-10-20 PROCEDURE — G0008: CPT

## 2023-10-23 DIAGNOSIS — R79.89 OTHER SPECIFIED ABNORMAL FINDINGS OF BLOOD CHEMISTRY: ICD-10-CM

## 2023-10-23 LAB
ALBUMIN SERPL ELPH-MCNC: 4.2 G/DL
ALP BLD-CCNC: 73 U/L
ALT SERPL-CCNC: 16 U/L
ANION GAP SERPL CALC-SCNC: 10 MMOL/L
AST SERPL-CCNC: 23 U/L
BASOPHILS # BLD AUTO: 0.04 K/UL
BASOPHILS NFR BLD AUTO: 1.1 %
BILIRUB SERPL-MCNC: 0.3 MG/DL
BUN SERPL-MCNC: 18 MG/DL
CALCIUM SERPL-MCNC: 9.6 MG/DL
CHLORIDE SERPL-SCNC: 103 MMOL/L
CHOLEST SERPL-MCNC: 146 MG/DL
CO2 SERPL-SCNC: 26 MMOL/L
CREAT SERPL-MCNC: 0.87 MG/DL
EGFR: 68 ML/MIN/1.73M2
EOSINOPHIL # BLD AUTO: 0.11 K/UL
EOSINOPHIL NFR BLD AUTO: 3 %
ESTIMATED AVERAGE GLUCOSE: 140 MG/DL
GLUCOSE SERPL-MCNC: 114 MG/DL
HBA1C MFR BLD HPLC: 6.5 %
HCT VFR BLD CALC: 37.7 %
HDLC SERPL-MCNC: 63 MG/DL
HGB BLD-MCNC: 11.6 G/DL
IMM GRANULOCYTES NFR BLD AUTO: 0 %
LDLC SERPL CALC-MCNC: 71 MG/DL
LYMPHOCYTES # BLD AUTO: 1.47 K/UL
LYMPHOCYTES NFR BLD AUTO: 40.1 %
MAN DIFF?: NORMAL
MCHC RBC-ENTMCNC: 25.3 PG
MCHC RBC-ENTMCNC: 30.8 GM/DL
MCV RBC AUTO: 82.3 FL
MONOCYTES # BLD AUTO: 0.37 K/UL
MONOCYTES NFR BLD AUTO: 10.1 %
NEUTROPHILS # BLD AUTO: 1.68 K/UL
NEUTROPHILS NFR BLD AUTO: 45.7 %
NONHDLC SERPL-MCNC: 83 MG/DL
PLATELET # BLD AUTO: 172 K/UL
POTASSIUM SERPL-SCNC: 4.6 MMOL/L
PROT SERPL-MCNC: 6.9 G/DL
RBC # BLD: 4.58 M/UL
RBC # FLD: 16.5 %
SODIUM SERPL-SCNC: 140 MMOL/L
TRIGL SERPL-MCNC: 60 MG/DL
WBC # FLD AUTO: 3.67 K/UL

## 2023-11-03 ENCOUNTER — RX RENEWAL (OUTPATIENT)
Age: 78
End: 2023-11-03

## 2023-11-05 RX ORDER — FUROSEMIDE 40 MG/1
40 TABLET ORAL
Qty: 90 | Refills: 3 | Status: ACTIVE | COMMUNITY
Start: 2022-08-30

## 2023-11-06 RX ORDER — APIXABAN 5 MG/1
5 TABLET, FILM COATED ORAL
Qty: 60 | Refills: 11 | Status: ACTIVE | COMMUNITY
Start: 2022-08-30

## 2023-12-20 ENCOUNTER — RX RENEWAL (OUTPATIENT)
Age: 78
End: 2023-12-20

## 2024-01-22 ENCOUNTER — APPOINTMENT (OUTPATIENT)
Dept: INTERNAL MEDICINE | Facility: CLINIC | Age: 79
End: 2024-01-22

## 2024-02-07 ENCOUNTER — LABORATORY RESULT (OUTPATIENT)
Age: 79
End: 2024-02-07

## 2024-02-07 ENCOUNTER — NON-APPOINTMENT (OUTPATIENT)
Age: 79
End: 2024-02-07

## 2024-02-07 ENCOUNTER — APPOINTMENT (OUTPATIENT)
Dept: CARDIOLOGY | Facility: CLINIC | Age: 79
End: 2024-02-07
Payer: MEDICARE

## 2024-02-07 VITALS
SYSTOLIC BLOOD PRESSURE: 133 MMHG | HEIGHT: 64 IN | WEIGHT: 205 LBS | HEART RATE: 77 BPM | BODY MASS INDEX: 35 KG/M2 | DIASTOLIC BLOOD PRESSURE: 75 MMHG | OXYGEN SATURATION: 98 %

## 2024-02-07 PROCEDURE — G2211 COMPLEX E/M VISIT ADD ON: CPT

## 2024-02-07 PROCEDURE — 99214 OFFICE O/P EST MOD 30 MIN: CPT

## 2024-02-07 PROCEDURE — 93000 ELECTROCARDIOGRAM COMPLETE: CPT

## 2024-02-07 RX ORDER — BENZONATATE 200 MG/1
200 CAPSULE ORAL
Qty: 14 | Refills: 0 | Status: DISCONTINUED | COMMUNITY
Start: 2022-12-20 | End: 2024-02-07

## 2024-02-07 NOTE — CARDIOLOGY SUMMARY
[de-identified] : Rate controlled atrial fibrillation. Low voltage.  Nonspecific T wave changes. [de-identified] : pharm\par  10/2022\par  - infarct \par  -ischemia [de-identified] : 8/18/22\par  Technically difficult study\par  Normal left ventricular internal dimensions and systolic function, \par  estimated LVEF of 65%.\par  The right ventricle is not well-visualized\par  Sclerotic trileaflet aortic valve with grossly normal opening, mild AI.\par  Trace physiologic MR and TR.\par  No significant pericardial effusion.\par

## 2024-02-07 NOTE — HISTORY OF PRESENT ILLNESS
[FreeTextEntry1] : This is a 78 year old female with a history of AF, diabetes, hypertension, and HFpEF wh presents to the office for follow up.  In August of 2022 she was admitted to  with new onset AF and decompensated diastolic CHF.  She was rate controlled diuresed, and discharged.  She had an echocardiogram that showed normal LV function with moderate AS.    Ms Palmer arrives today with her son for follow-up. She is able to lay with 1 pillow more comfortable now.  She is less short of breath with ambulation.   She has been compliant with her medications.  She reports red tinged urine.   She denies chest pains or pressure. She  denies dizzy spells, feeling faint or fainting, She   denies palpitations or difficulty breathing while laying flat. She denies lower extremity swelling.

## 2024-02-07 NOTE — DISCUSSION/SUMMARY
[FreeTextEntry1] : This is a 78 year old female with a history of AF, diabetes, hypertension, and HFpEF who presents to the office for follow up.   She had an echocardiogram that showed normal LV function and moderate AS.  She needs a repeat echocardiogram.   She is in no acute distress.  She is  euvolemic on  exam.    ECG illustrates atrial fibrillation, rate controlled.  Her blood pressure is controlled.   She will continue Lasix to 40 mg once a day, with potassium supplement 20 mEq every other day.  Her latest CMP demonstrated normal creatinine and a potassium of 5.  She is reporting red tinged urine.  I am checking a CBC and a UA.     She will continue Toprol XL 25 QD for rate control. She will continue Eliquis for stroke risk reduction.     She will continue lifestyle modifications such as increasing exercise and monitoring her diet. She will followup again in 3 month. [EKG obtained to assist in diagnosis and management of assessed problem(s)] : EKG obtained to assist in diagnosis and management of assessed problem(s)

## 2024-02-08 LAB
APPEARANCE: CLEAR
BILIRUBIN URINE: NEGATIVE
BLOOD URINE: NEGATIVE
COLOR: YELLOW
GLUCOSE QUALITATIVE U: NEGATIVE MG/DL
HCT VFR BLD CALC: 39.3 %
HGB BLD-MCNC: 11.8 G/DL
KETONES URINE: NEGATIVE MG/DL
LEUKOCYTE ESTERASE URINE: ABNORMAL
MCHC RBC-ENTMCNC: 25.2 PG
MCHC RBC-ENTMCNC: 30 GM/DL
MCV RBC AUTO: 84 FL
NITRITE URINE: NEGATIVE
PH URINE: 6
PLATELET # BLD AUTO: 170 K/UL
PROTEIN URINE: NEGATIVE MG/DL
RBC # BLD: 4.68 M/UL
RBC # FLD: 15.9 %
SPECIFIC GRAVITY URINE: 1.02
UROBILINOGEN URINE: 0.2 MG/DL
WBC # FLD AUTO: 4.53 K/UL

## 2024-02-14 ENCOUNTER — APPOINTMENT (OUTPATIENT)
Dept: CARDIOLOGY | Facility: CLINIC | Age: 79
End: 2024-02-14
Payer: MEDICARE

## 2024-02-14 ENCOUNTER — MED ADMIN CHARGE (OUTPATIENT)
Age: 79
End: 2024-02-14

## 2024-02-14 PROCEDURE — 93306 TTE W/DOPPLER COMPLETE: CPT

## 2024-02-14 RX ORDER — PERFLUTREN 6.52 MG/ML
6.52 INJECTION, SUSPENSION INTRAVENOUS
Qty: 1 | Refills: 0 | Status: COMPLETED | OUTPATIENT
Start: 2024-02-14

## 2024-02-14 RX ADMIN — PERFLUTREN MG/ML: 6.52 INJECTION, SUSPENSION INTRAVENOUS at 00:00

## 2024-02-20 ENCOUNTER — RX RENEWAL (OUTPATIENT)
Age: 79
End: 2024-02-20

## 2024-02-20 RX ORDER — METFORMIN HYDROCHLORIDE 500 MG/1
500 TABLET, COATED ORAL DAILY
Qty: 90 | Refills: 0 | Status: ACTIVE | COMMUNITY
Start: 2022-08-30 | End: 1900-01-01

## 2024-05-03 ENCOUNTER — RX RENEWAL (OUTPATIENT)
Age: 79
End: 2024-05-03

## 2024-05-03 RX ORDER — METOPROLOL SUCCINATE 25 MG/1
25 TABLET, EXTENDED RELEASE ORAL DAILY
Qty: 90 | Refills: 2 | Status: ACTIVE | COMMUNITY
Start: 2022-09-20

## 2024-05-07 ENCOUNTER — NON-APPOINTMENT (OUTPATIENT)
Age: 79
End: 2024-05-07

## 2024-05-07 ENCOUNTER — APPOINTMENT (OUTPATIENT)
Dept: CARDIOLOGY | Facility: CLINIC | Age: 79
End: 2024-05-07
Payer: MEDICARE

## 2024-05-07 VITALS
HEIGHT: 64 IN | HEART RATE: 76 BPM | WEIGHT: 200 LBS | OXYGEN SATURATION: 96 % | BODY MASS INDEX: 34.15 KG/M2 | SYSTOLIC BLOOD PRESSURE: 124 MMHG | DIASTOLIC BLOOD PRESSURE: 87 MMHG

## 2024-05-07 DIAGNOSIS — I35.0 NONRHEUMATIC AORTIC (VALVE) STENOSIS: ICD-10-CM

## 2024-05-07 DIAGNOSIS — I48.91 UNSPECIFIED ATRIAL FIBRILLATION: ICD-10-CM

## 2024-05-07 PROCEDURE — 93000 ELECTROCARDIOGRAM COMPLETE: CPT

## 2024-05-07 PROCEDURE — 99214 OFFICE O/P EST MOD 30 MIN: CPT

## 2024-05-07 PROCEDURE — G2211 COMPLEX E/M VISIT ADD ON: CPT

## 2024-05-07 NOTE — DISCUSSION/SUMMARY
[FreeTextEntry1] : This is a 78 year old female with a history of AF, diabetes, hypertension, and HFpEF who presents to the office for follow up.   She had an echocardiogram that showed normal LV function and moderate AS.     She is in no acute distress.  She is  euvolemic on  exam.    ECG illustrates atrial fibrillation, rate controlled.  Her blood pressure is controlled.   She will continue Lasix to 40 mg once a day, with potassium supplement 20 mEq every other day.     She will continue Toprol XL 25 QD for rate control. She will continue Eliquis for stroke risk reduction.     She will continue lifestyle modifications such as increasing exercise and monitoring her diet. She will followup again in 3 month. [EKG obtained to assist in diagnosis and management of assessed problem(s)] : EKG obtained to assist in diagnosis and management of assessed problem(s)

## 2024-05-07 NOTE — HISTORY OF PRESENT ILLNESS
[FreeTextEntry1] : This is a 78 year old female with a history of AF, diabetes, hypertension, and HFpEF wh presents to the office for follow up.  In August of 2022 she was admitted to  with new onset AF and decompensated diastolic CHF.  She was rate controlled diuresed, and discharged.  She had an echocardiogram that showed normal LV function with moderate AS.    Ms Palmer arrives today with her son for follow-up. She is able to lay with 1 pillow more comfortable now.  She is less short of breath with ambulation.   She has been compliant with her medications.     She denies chest pains or pressure. She  denies dizzy spells, feeling faint or fainting, She   denies palpitations or difficulty breathing while laying flat. She denies lower extremity swelling.

## 2024-05-07 NOTE — CARDIOLOGY SUMMARY
[de-identified] : Rate controlled atrial fibrillation. Low voltage.  Nonspecific T wave changes. [de-identified] : pharm\par  10/2022\par  - infarct \par  -ischemia [de-identified] : 8/18/22\par  Technically difficult study\par  Normal left ventricular internal dimensions and systolic function, \par  estimated LVEF of 65%.\par  The right ventricle is not well-visualized\par  Sclerotic trileaflet aortic valve with grossly normal opening, mild AI.\par  Trace physiologic MR and TR.\par  No significant pericardial effusion.\par

## 2024-07-02 ENCOUNTER — RX RENEWAL (OUTPATIENT)
Age: 79
End: 2024-07-02

## 2024-07-03 ENCOUNTER — RX RENEWAL (OUTPATIENT)
Age: 79
End: 2024-07-03

## 2024-08-05 ENCOUNTER — APPOINTMENT (OUTPATIENT)
Dept: INTERNAL MEDICINE | Facility: CLINIC | Age: 79
End: 2024-08-05

## 2024-08-06 ENCOUNTER — APPOINTMENT (OUTPATIENT)
Dept: CARDIOLOGY | Facility: CLINIC | Age: 79
End: 2024-08-06

## 2024-08-06 ENCOUNTER — NON-APPOINTMENT (OUTPATIENT)
Age: 79
End: 2024-08-06

## 2024-08-06 PROCEDURE — 99214 OFFICE O/P EST MOD 30 MIN: CPT

## 2024-08-06 PROCEDURE — G2211 COMPLEX E/M VISIT ADD ON: CPT

## 2024-08-06 PROCEDURE — 93000 ELECTROCARDIOGRAM COMPLETE: CPT

## 2024-08-06 NOTE — CARDIOLOGY SUMMARY
[de-identified] : Rate controlled atrial fibrillation. Low voltage.  Nonspecific T wave changes. 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

## 2024-11-22 ENCOUNTER — NON-APPOINTMENT (OUTPATIENT)
Age: 79
End: 2024-11-22

## 2024-11-22 ENCOUNTER — APPOINTMENT (OUTPATIENT)
Dept: CARDIOLOGY | Facility: CLINIC | Age: 79
End: 2024-11-22
Payer: MEDICARE

## 2024-11-22 VITALS — SYSTOLIC BLOOD PRESSURE: 118 MMHG | DIASTOLIC BLOOD PRESSURE: 76 MMHG

## 2024-11-22 VITALS
OXYGEN SATURATION: 98 % | SYSTOLIC BLOOD PRESSURE: 139 MMHG | HEIGHT: 64 IN | BODY MASS INDEX: 34.49 KG/M2 | DIASTOLIC BLOOD PRESSURE: 88 MMHG | WEIGHT: 202 LBS | HEART RATE: 82 BPM

## 2024-11-22 DIAGNOSIS — I48.91 UNSPECIFIED ATRIAL FIBRILLATION: ICD-10-CM

## 2024-11-22 DIAGNOSIS — I35.0 NONRHEUMATIC AORTIC (VALVE) STENOSIS: ICD-10-CM

## 2024-11-22 PROCEDURE — 99214 OFFICE O/P EST MOD 30 MIN: CPT

## 2024-11-22 PROCEDURE — G2211 COMPLEX E/M VISIT ADD ON: CPT

## 2024-11-22 PROCEDURE — 93000 ELECTROCARDIOGRAM COMPLETE: CPT

## 2024-11-25 LAB
ALBUMIN SERPL ELPH-MCNC: 4.1 G/DL
ALP BLD-CCNC: 76 U/L
ALT SERPL-CCNC: 12 U/L
ANION GAP SERPL CALC-SCNC: 12 MMOL/L
APPEARANCE: CLEAR
AST SERPL-CCNC: 16 U/L
BACTERIA: NEGATIVE /HPF
BILIRUB SERPL-MCNC: 0.4 MG/DL
BILIRUBIN URINE: NEGATIVE
BLOOD URINE: NEGATIVE
BUN SERPL-MCNC: 15 MG/DL
CALCIUM SERPL-MCNC: 9.8 MG/DL
CAST: 0 /LPF
CHLORIDE SERPL-SCNC: 103 MMOL/L
CHOLEST SERPL-MCNC: 154 MG/DL
CO2 SERPL-SCNC: 28 MMOL/L
COLOR: NORMAL
CREAT SERPL-MCNC: 0.89 MG/DL
EGFR: 66 ML/MIN/1.73M2
EPITHELIAL CELLS: 4 /HPF
ESTIMATED AVERAGE GLUCOSE: 131 MG/DL
GLUCOSE QUALITATIVE U: NEGATIVE MG/DL
GLUCOSE SERPL-MCNC: 100 MG/DL
HBA1C MFR BLD HPLC: 6.2 %
HCT VFR BLD CALC: 38.1 %
HDLC SERPL-MCNC: 60 MG/DL
HGB BLD-MCNC: 11.7 G/DL
KETONES URINE: NEGATIVE MG/DL
LDLC SERPL CALC-MCNC: 81 MG/DL
LEUKOCYTE ESTERASE URINE: ABNORMAL
MCHC RBC-ENTMCNC: 25.2 PG
MCHC RBC-ENTMCNC: 30.7 G/DL
MCV RBC AUTO: 82.1 FL
MICROSCOPIC-UA: NORMAL
NITRITE URINE: NEGATIVE
NONHDLC SERPL-MCNC: 94 MG/DL
PH URINE: 6.5
PLATELET # BLD AUTO: 180 K/UL
POTASSIUM SERPL-SCNC: 4.5 MMOL/L
PROT SERPL-MCNC: 6.6 G/DL
PROTEIN URINE: NORMAL MG/DL
RBC # BLD: 4.64 M/UL
RBC # FLD: 15.9 %
RED BLOOD CELLS URINE: 1 /HPF
SODIUM SERPL-SCNC: 143 MMOL/L
SPECIFIC GRAVITY URINE: 1.02
TRIGL SERPL-MCNC: 64 MG/DL
TSH SERPL-ACNC: 2.65 UIU/ML
UROBILINOGEN URINE: 1 MG/DL
WBC # FLD AUTO: 4.25 K/UL
WHITE BLOOD CELLS URINE: 8 /HPF

## 2024-12-24 ENCOUNTER — RX RENEWAL (OUTPATIENT)
Age: 79
End: 2024-12-24

## 2024-12-27 ENCOUNTER — RX RENEWAL (OUTPATIENT)
Age: 79
End: 2024-12-27

## 2025-01-06 ENCOUNTER — RX RENEWAL (OUTPATIENT)
Age: 80
End: 2025-01-06

## 2025-03-17 ENCOUNTER — NON-APPOINTMENT (OUTPATIENT)
Age: 80
End: 2025-03-17

## 2025-03-17 ENCOUNTER — APPOINTMENT (OUTPATIENT)
Dept: CARDIOLOGY | Facility: CLINIC | Age: 80
End: 2025-03-17
Payer: MEDICARE

## 2025-03-17 VITALS
OXYGEN SATURATION: 95 % | HEART RATE: 83 BPM | WEIGHT: 200 LBS | DIASTOLIC BLOOD PRESSURE: 95 MMHG | SYSTOLIC BLOOD PRESSURE: 155 MMHG | BODY MASS INDEX: 34.15 KG/M2 | HEIGHT: 64 IN

## 2025-03-17 VITALS — DIASTOLIC BLOOD PRESSURE: 78 MMHG | SYSTOLIC BLOOD PRESSURE: 126 MMHG

## 2025-03-17 DIAGNOSIS — I35.0 NONRHEUMATIC AORTIC (VALVE) STENOSIS: ICD-10-CM

## 2025-03-17 DIAGNOSIS — I48.91 UNSPECIFIED ATRIAL FIBRILLATION: ICD-10-CM

## 2025-03-17 PROCEDURE — 99214 OFFICE O/P EST MOD 30 MIN: CPT

## 2025-03-17 PROCEDURE — G2211 COMPLEX E/M VISIT ADD ON: CPT

## 2025-03-17 PROCEDURE — 93000 ELECTROCARDIOGRAM COMPLETE: CPT

## 2025-03-18 LAB
ANION GAP SERPL CALC-SCNC: 10 MMOL/L
BUN SERPL-MCNC: 13 MG/DL
CALCIUM SERPL-MCNC: 9.4 MG/DL
CHLORIDE SERPL-SCNC: 101 MMOL/L
CO2 SERPL-SCNC: 29 MMOL/L
CREAT SERPL-MCNC: 0.98 MG/DL
EGFRCR SERPLBLD CKD-EPI 2021: 59 ML/MIN/1.73M2
GLUCOSE SERPL-MCNC: 95 MG/DL
POTASSIUM SERPL-SCNC: 4.4 MMOL/L
SODIUM SERPL-SCNC: 140 MMOL/L

## 2025-03-22 NOTE — ED PROVIDER NOTE - CARDIAC, MLM
Problem: Chronic Conditions and Co-morbidities  Goal: Patient's chronic conditions and co-morbidity symptoms are monitored and maintained or improved  3/22/2025 0943 by Laurel Arnold RN  Outcome: Not Progressing  Flowsheets (Taken 3/22/2025 0800)  Care Plan - Patient's Chronic Conditions and Co-Morbidity Symptoms are Monitored and Maintained or Improved:   Monitor and assess patient's chronic conditions and comorbid symptoms for stability, deterioration, or improvement   Collaborate with multidisciplinary team to address chronic and comorbid conditions and prevent exacerbation or deterioration   Update acute care plan with appropriate goals if chronic or comorbid symptoms are exacerbated and prevent overall improvement and discharge  3/21/2025 2016 by Josefina Jackson RN  Outcome: Progressing  Flowsheets (Taken 3/21/2025 2000)  Care Plan - Patient's Chronic Conditions and Co-Morbidity Symptoms are Monitored and Maintained or Improved: Monitor and assess patient's chronic conditions and comorbid symptoms for stability, deterioration, or improvement     Problem: Discharge Planning  Goal: Discharge to home or other facility with appropriate resources  3/22/2025 0943 by Laurel Arnold RN  Outcome: Not Progressing  Flowsheets (Taken 3/22/2025 0800)  Discharge to home or other facility with appropriate resources: Identify barriers to discharge with patient and caregiver  3/21/2025 2016 by Josefina Jackson RN  Outcome: Not Progressing  Flowsheets (Taken 3/21/2025 2000)  Discharge to home or other facility with appropriate resources: Identify barriers to discharge with patient and caregiver     Problem: Neurosensory - Adult  Goal: Achieves maximal functionality and self care  3/21/2025 2016 by Josefina Jackson RN  Outcome: Not Progressing     Problem: Musculoskeletal - Adult  Goal: Return mobility to safest level of function  3/22/2025 0943 by Laurel Arnold RN  Outcome: Not Progressing  3/21/2025 2016 by Renetta  4-6 hours minimum: Change oxygen saturation probe site   Every 4-6 hours: If on nasal continuous positive airway pressure, respiratory therapy assesses nares and determine need for appliance change or resting period     Problem: Nutrition Deficit:  Goal: Optimize nutritional status  3/22/2025 0943 by Laurel Arnold RN  Outcome: Progressing  3/21/2025 2016 by Josefina Jackson RN  Outcome: Progressing     Problem: ABCDS Injury Assessment  Goal: Absence of physical injury  3/22/2025 0943 by Laurel Arnold RN  Outcome: Progressing  Flowsheets (Taken 3/22/2025 0800)  Absence of Physical Injury: Implement safety measures based on patient assessment  3/21/2025 2016 by Josefina Jackson RN  Outcome: Progressing  Flowsheets (Taken 3/21/2025 2000)  Absence of Physical Injury: Implement safety measures based on patient assessment     Problem: Metabolic/Fluid and Electrolytes - Adult  Goal: Electrolytes maintained within normal limits  3/22/2025 0943 by Laurel Arnold RN  Outcome: Progressing  Flowsheets (Taken 3/22/2025 0800)  Electrolytes maintained within normal limits:   Monitor labs and assess patient for signs and symptoms of electrolyte imbalances   Administer electrolyte replacement as ordered   Monitor response to electrolyte replacements, including repeat lab results as appropriate  3/21/2025 2016 by Josefina Jackson RN  Outcome: Progressing  Flowsheets (Taken 3/21/2025 2000)  Electrolytes maintained within normal limits:   Monitor labs and assess patient for signs and symptoms of electrolyte imbalances   Administer electrolyte replacement as ordered  Goal: Hemodynamic stability and optimal renal function maintained  3/22/2025 0943 by Laurel Arnold RN  Outcome: Progressing  Flowsheets (Taken 3/22/2025 0800)  Hemodynamic stability and optimal renal function maintained:   Monitor labs and assess for signs and symptoms of volume excess or deficit   Monitor intake, output and patient weight  3/21/2025 2016 by Renetta  Normal rate, regular rhythm.  Heart sounds S1, S2.  No murmurs, rubs or gallops.

## 2025-04-02 ENCOUNTER — RX RENEWAL (OUTPATIENT)
Age: 80
End: 2025-04-02

## 2025-04-07 ENCOUNTER — APPOINTMENT (OUTPATIENT)
Dept: CARDIOLOGY | Facility: CLINIC | Age: 80
End: 2025-04-07
Payer: MEDICARE

## 2025-04-07 PROCEDURE — 93306 TTE W/DOPPLER COMPLETE: CPT

## 2025-07-02 ENCOUNTER — RX RENEWAL (OUTPATIENT)
Age: 80
End: 2025-07-02

## 2025-09-08 ENCOUNTER — APPOINTMENT (OUTPATIENT)
Dept: CARDIOLOGY | Facility: CLINIC | Age: 80
End: 2025-09-08
Payer: MEDICARE

## 2025-09-08 VITALS
WEIGHT: 198 LBS | OXYGEN SATURATION: 96 % | HEIGHT: 64 IN | HEART RATE: 85 BPM | BODY MASS INDEX: 33.8 KG/M2 | DIASTOLIC BLOOD PRESSURE: 90 MMHG | SYSTOLIC BLOOD PRESSURE: 154 MMHG

## 2025-09-08 VITALS — SYSTOLIC BLOOD PRESSURE: 120 MMHG | DIASTOLIC BLOOD PRESSURE: 80 MMHG

## 2025-09-08 DIAGNOSIS — I35.0 NONRHEUMATIC AORTIC (VALVE) STENOSIS: ICD-10-CM

## 2025-09-08 DIAGNOSIS — I48.91 UNSPECIFIED ATRIAL FIBRILLATION: ICD-10-CM

## 2025-09-08 DIAGNOSIS — I10 ESSENTIAL (PRIMARY) HYPERTENSION: ICD-10-CM

## 2025-09-08 PROCEDURE — G2211 COMPLEX E/M VISIT ADD ON: CPT

## 2025-09-08 PROCEDURE — 99214 OFFICE O/P EST MOD 30 MIN: CPT

## 2025-09-08 PROCEDURE — 93000 ELECTROCARDIOGRAM COMPLETE: CPT
